# Patient Record
Sex: MALE
[De-identification: names, ages, dates, MRNs, and addresses within clinical notes are randomized per-mention and may not be internally consistent; named-entity substitution may affect disease eponyms.]

---

## 2020-11-29 ENCOUNTER — HOSPITAL ENCOUNTER (INPATIENT)
Dept: HOSPITAL 88 - ER | Age: 45
LOS: 5 days | Discharge: HOME | DRG: 581 | End: 2020-12-04
Attending: INTERNAL MEDICINE | Admitting: INTERNAL MEDICINE
Payer: COMMERCIAL

## 2020-11-29 VITALS — DIASTOLIC BLOOD PRESSURE: 83 MMHG | SYSTOLIC BLOOD PRESSURE: 139 MMHG

## 2020-11-29 VITALS — SYSTOLIC BLOOD PRESSURE: 140 MMHG | DIASTOLIC BLOOD PRESSURE: 73 MMHG

## 2020-11-29 VITALS — DIASTOLIC BLOOD PRESSURE: 75 MMHG | SYSTOLIC BLOOD PRESSURE: 133 MMHG

## 2020-11-29 VITALS — HEIGHT: 65 IN | BODY MASS INDEX: 31.49 KG/M2 | WEIGHT: 189 LBS

## 2020-11-29 VITALS — SYSTOLIC BLOOD PRESSURE: 139 MMHG | DIASTOLIC BLOOD PRESSURE: 83 MMHG

## 2020-11-29 VITALS — SYSTOLIC BLOOD PRESSURE: 133 MMHG | DIASTOLIC BLOOD PRESSURE: 75 MMHG

## 2020-11-29 VITALS — DIASTOLIC BLOOD PRESSURE: 73 MMHG | SYSTOLIC BLOOD PRESSURE: 140 MMHG

## 2020-11-29 DIAGNOSIS — L03.211: Primary | ICD-10-CM

## 2020-11-29 DIAGNOSIS — E78.5: ICD-10-CM

## 2020-11-29 DIAGNOSIS — I10: ICD-10-CM

## 2020-11-29 DIAGNOSIS — Z20.828: ICD-10-CM

## 2020-11-29 DIAGNOSIS — E78.00: ICD-10-CM

## 2020-11-29 DIAGNOSIS — F41.9: ICD-10-CM

## 2020-11-29 LAB
ALBUMIN SERPL-MCNC: 4.4 G/DL (ref 3.5–5)
ALBUMIN/GLOB SERPL: 1.4 {RATIO} (ref 0.8–2)
ALP SERPL-CCNC: 71 IU/L (ref 40–150)
ALT SERPL-CCNC: 46 IU/L (ref 0–55)
ANION GAP SERPL CALC-SCNC: 28.6 MMOL/L (ref 8–16)
BASOPHILS # BLD AUTO: 0 10*3/UL (ref 0–0.1)
BASOPHILS NFR BLD AUTO: 0.5 % (ref 0–1)
BUN SERPL-MCNC: 10 MG/DL (ref 7–26)
BUN/CREAT SERPL: 11 (ref 6–25)
CALCIUM SERPL-MCNC: 8.8 MG/DL (ref 8.4–10.2)
CHLORIDE SERPL-SCNC: 89 MMOL/L (ref 98–107)
CO2 SERPL-SCNC: 22 MMOL/L (ref 22–29)
DEPRECATED APTT PLAS QN: 20.2 SECONDS (ref 23.8–35.5)
DEPRECATED INR PLAS: 1.07
DEPRECATED NEUTROPHILS # BLD AUTO: 5.4 10*3/UL (ref 2.1–6.9)
EGFRCR SERPLBLD CKD-EPI 2021: > 60 ML/MIN (ref 60–?)
EOSINOPHIL # BLD AUTO: 0.2 10*3/UL (ref 0–0.4)
EOSINOPHIL NFR BLD AUTO: 2 % (ref 0–6)
ERYTHROCYTE [DISTWIDTH] IN CORD BLOOD: 12.7 % (ref 11.7–14.4)
GLOBULIN PLAS-MCNC: 3.1 G/DL (ref 2.3–3.5)
GLUCOSE SERPLBLD-MCNC: 108 MG/DL (ref 74–118)
HCT VFR BLD AUTO: 43.3 % (ref 38.2–49.6)
HGB BLD-MCNC: 14.5 G/DL (ref 14–18)
LYMPHOCYTES # BLD: 1.4 10*3/UL (ref 1–3.2)
LYMPHOCYTES NFR BLD AUTO: 17.9 % (ref 18–39.1)
MCH RBC QN AUTO: 29.7 PG (ref 28–32)
MCHC RBC AUTO-ENTMCNC: 33.5 G/DL (ref 31–35)
MCV RBC AUTO: 88.7 FL (ref 81–99)
MONOCYTES # BLD AUTO: 0.7 10*3/UL (ref 0.2–0.8)
MONOCYTES NFR BLD AUTO: 8.5 % (ref 4.4–11.3)
NEUTS SEG NFR BLD AUTO: 70.7 % (ref 38.7–80)
PLATELET # BLD AUTO: 225 X10E3/UL (ref 140–360)
POTASSIUM SERPL-SCNC: 3.6 MMOL/L (ref 3.5–5.1)
PROTHROMBIN TIME: 14.4 SECONDS (ref 11.9–14.5)
RBC # BLD AUTO: 4.88 X10E6/UL (ref 4.3–5.7)
SODIUM SERPL-SCNC: 136 MMOL/L (ref 136–145)

## 2020-11-29 PROCEDURE — 80048 BASIC METABOLIC PNL TOTAL CA: CPT

## 2020-11-29 PROCEDURE — 87071 CULTURE AEROBIC QUANT OTHER: CPT

## 2020-11-29 PROCEDURE — 87075 CULTR BACTERIA EXCEPT BLOOD: CPT

## 2020-11-29 PROCEDURE — 85025 COMPLETE CBC W/AUTO DIFF WBC: CPT

## 2020-11-29 PROCEDURE — 36415 COLL VENOUS BLD VENIPUNCTURE: CPT

## 2020-11-29 PROCEDURE — 80202 ASSAY OF VANCOMYCIN: CPT

## 2020-11-29 PROCEDURE — 70487 CT MAXILLOFACIAL W/DYE: CPT

## 2020-11-29 PROCEDURE — 85610 PROTHROMBIN TIME: CPT

## 2020-11-29 PROCEDURE — 80053 COMPREHEN METABOLIC PANEL: CPT

## 2020-11-29 PROCEDURE — 87205 SMEAR GRAM STAIN: CPT

## 2020-11-29 PROCEDURE — 85730 THROMBOPLASTIN TIME PARTIAL: CPT

## 2020-11-29 PROCEDURE — 87040 BLOOD CULTURE FOR BACTERIA: CPT

## 2020-11-29 PROCEDURE — 83605 ASSAY OF LACTIC ACID: CPT

## 2020-11-29 PROCEDURE — 99284 EMERGENCY DEPT VISIT MOD MDM: CPT

## 2020-11-29 RX ADMIN — SODIUM CHLORIDE PRN MG: 900 INJECTION INTRAVENOUS at 20:05

## 2020-11-29 RX ADMIN — VANCOMYCIN HYDROCHLORIDE SCH MLS/HR: 1 INJECTION, SOLUTION INTRAVENOUS at 22:41

## 2020-11-29 RX ADMIN — Medication PRN MG: at 20:05

## 2020-11-29 RX ADMIN — CLINDAMYCIN PHOSPHATE SCH MLS/HR: 6 INJECTION, SOLUTION INTRAVENOUS at 21:12

## 2020-11-29 RX ADMIN — SODIUM CHLORIDE SCH MLS/HR: 9 INJECTION, SOLUTION INTRAVENOUS at 14:55

## 2020-11-29 RX ADMIN — TAZOBACTAM SODIUM AND PIPERACILLIN SODIUM SCH MLS/HR: 375; 3 INJECTION, SOLUTION INTRAVENOUS at 17:29

## 2020-11-29 RX ADMIN — SODIUM CHLORIDE SCH MLS/HR: 9 INJECTION, SOLUTION INTRAVENOUS at 23:32

## 2020-11-29 RX ADMIN — CLINDAMYCIN PHOSPHATE SCH MLS/HR: 6 INJECTION, SOLUTION INTRAVENOUS at 14:55

## 2020-11-29 RX ADMIN — TAZOBACTAM SODIUM AND PIPERACILLIN SODIUM SCH MLS/HR: 375; 3 INJECTION, SOLUTION INTRAVENOUS at 14:00

## 2020-11-29 NOTE — EMERGENCY DEPARTMENT NOTE
History of Present Illnes


History of Present Illness


Chief Complaint:  Skin Rash or Abscess


History of Present Illness


This is a 45 year old  male LAST NIGHT FELT ITCHING TO LEFT SIDE OF 

FACE. WOKE UP THIS MORNING WITH REDNESS, SWELLING, TENDER, INDURATED.


Historian:  Patient


Additional Treatment PTA:  NONE


 Required:  No


Onset (how long ago):  hour(s)


Location:  LEFT FACE


Quality:  PAIN/SWELLING


Radiation:  Reports non-radiation


Severity:  moderate


Onset quality:  gradual


Timing of current episode:  constant


Progression:  worsening


Chronicity:  new


Context:  Denies recent illness


Relieving factors:  none


Exacerbating factors:  none


Associated symptoms:  Reports denies other symptoms





Past Medical/Family History


Physician Review


I have reviewed the patient's past medical and family history.  Any updates have

been documented here.





Past Medical History


Recent Fever:  No


Clinical Suspicion of Infectio:  No


New/Unexplained Change in Ment:  No


Past Medical History:  Hypertension, Anxiety, Hyperlipedemia


Past Surgical History:  Cholecysctectomy





Social History


Smoking Cessation:  Never Smoker


Counseling Performed:  No


Alcohol Use:  None


Any Illegal Drug Use:  No


TB Exposure/Symptoms:  No


Physically hurt or threatened:  No





Family History


Family history of heart diseas:  No





Other


Any Pre-Existing Lines (PICC,:  No





Review of Systems


Review of Systems


Constitutional:  Reports no symptoms


EENTM:  Reports as per HPI


Cardiovascular:  Reports no symptoms


Respiratory:  Reports no symptoms


Gastrointestinal:  Reports no symptoms


Genitourinary:  Reports no symptoms


Musculoskeletal:  Reports no symptoms


Integumentary:  Reports no symptoms


Neurological:  Reports no symptoms


Psychological:  Reports no symptoms


Endocrine:  Reports no symptoms


Hematological/Lymphatic:  Reports no symptoms





Physical Exam


Related Data


Allergies:  


Coded Allergies:  


     No Known Allergies (Unverified , 11/29/20)


Triage Vital Signs





Vital Signs








  Date Time  Temp Pulse Resp B/P (MAP) Pulse Ox O2 Delivery O2 Flow Rate FiO2


 


11/29/20 11:06 98.3 100 18 141/108 99 Room Air  








Vital signs reviewed:  Yes





Physical Exam


CONSTITUTIONAL





Constitutional:  Present well-developed, Present well-nourished


HENT


HENT:  Present normocephalic, Present atraumatic, Present other (LEFT FACIAL 

SWELLING/INDURATION, WITH TENDERNESS)


HENT L/R:  Present left ext ear normal, Present right ext ear normal


EYES





Eyes:  Reports PERRL, Reports conjunctivae normal


NECK


Neck:  Present ROM normal


PULMONARY


Pulmonary:  Present effort normal, Present breath sounds normal


CARDIOVASCULAR





Cardiovascular:  Present regular rhythm, Present heart sounds normal, Present 

capillary refill normal, Present normal rate


GASTROINTESTINAL





Abdominal:  Present soft, Present nontender, Present bowel sounds normal


GENITOURINARY





Genitourinary:  Present exam deferred


SKIN


Skin:  Present warm, Present dry


MUSCULOSKELETAL





Musculoskeletal:  Present ROM normal


NEUROLOGICAL





Neurological:  Present alert, Present oriented x 3, Present no gross motor or 

sensory deficits


PSYCHOLOGICAL


Psychological:  Present mood/affect normal, Present judgement normal





Results


Laboratory


Result Diagram:  


11/29/20 1124                                                                   

            11/29/20 1124





Laboratory





Laboratory Tests








Test


 11/29/20


12:38 11/29/20


11:24


 


White Blood Count


 


 7.64 x10e3/uL


(4.8-10.8)


 


Red Blood Count


 


 4.88 x10e6/uL


(4.3-5.7)


 


Hemoglobin


 


 14.5 g/dL


(14.0-18.0)


 


Hematocrit


 


 43.3 %


(38.2-49.6)


 


Mean Corpuscular Volume


 


 88.7 fL


(81-99)


 


Mean Corpuscular Hemoglobin


 


 29.7 pg


(28-32)


 


Mean Corpuscular Hemoglobin


Concent 


 33.5 g/dL


(31-35)


 


Red Cell Distribution Width


 


 12.7 %


(11.7-14.4)


 


Platelet Count


 


 225 x10e3/uL


(140-360)


 


Neutrophils (%) (Auto)


 


 70.7 %


(38.7-80.0)


 


Lymphocytes (%) (Auto)


 


 17.9 %


(18.0-39.1)


 


Monocytes (%) (Auto)


 


 8.5  %


(4.4-11.3)


 


Eosinophils (%) (Auto)


 


 2.0 %


(0.0-6.0)


 


Basophils (%) (Auto)


 


 0.5 %


(0.0-1.0)


 


Neutrophils # (Auto)  5.4 (2.1-6.9) 


 


Lymphocytes # (Auto)  1.4 (1.0-3.2) 


 


Monocytes # (Auto)  0.7 (0.2-0.8) 


 


Eosinophils # (Auto)  0.2 (0.0-0.4) 


 


Basophils # (Auto)  0.0 (0.0-0.1) 


 


Absolute Immature Granulocyte


(auto 


 0.03 x10e3/uL


(0-0.1)


 


Prothrombin Time


 


 14.4 seconds


(11.9-14.5)


 


Prothromb Time International


Ratio 


 1.07 





 


Activated Partial


Thromboplast Time 


 20.2 seconds


(23.8-35.5)


 


Sodium Level


 


 136 mmol/L


(136-145)


 


Potassium Level


 


 3.6 mmol/L


(3.5-5.1)


 


Chloride Level


 


 89 mmol/L


()


 


Carbon Dioxide Level


 


 22 mmol/L


(22-29)


 


Anion Gap


 


 28.6 mmol/L


(8-16)


 


Blood Urea Nitrogen


 


 10 mg/dL


(7-26)


 


Creatinine


 


 0.92 mg/dL


(0.72-1.25)


 


Estimat Glomerular Filtration


Rate 


 > 60 ML/MIN


(60-)


 


BUN/Creatinine Ratio  11 (6-25) 


 


Glucose Level


 


 108 mg/dL


()


 


Lactic Acid Level


 


 1.6 mmol/L


(0.5-2.0)


 


Calcium Level


 


 8.8 mg/dL


(8.4-10.2)


 


Total Bilirubin


 


 0.5 mg/dL


(0.2-1.2)


 


Aspartate Amino Transf


(AST/SGOT) 


 30 IU/L (5-34) 





 


Alanine Aminotransferase


(ALT/SGPT) 


 46 IU/L (0-55) 





 


Alkaline Phosphatase


 


 71 IU/L


()


 


Total Protein


 


 7.5 g/dL


(6.5-8.1)


 


Albumin


 


 4.4 g/dL


(3.5-5.0)


 


Globulin


 


 3.1 g/dL


(2.3-3.5)


 


Albumin/Globulin Ratio  1.4 (0.8-2.0) 








Lab results reviewed:  Yes





Imaging


Imaging results reviewed:  Yes





Assessment & Plan


Medical Decision Making


MDM


FACIAL SWELLING - CBC, CHEM, CX'S, LACTIC, CT FACE - R/O CELLULITIS VS ABSCESS, 

IV ABX'S, ADMIT





Reassessment


Reassessment


ADMIT TO DR FUNEZ





Assessment & Plan


Final Impression:  


(1) Facial cellulitis


Depart Disposition:  ADMITTED


Last Vital Signs











  Date Time  Temp Pulse Resp B/P (MAP) Pulse Ox O2 Delivery O2 Flow Rate FiO2


 


11/29/20 11:06 98.3 100 18 141/108 99 Room Air  








Medications in the ED





Sodium Chloride 1,000 ml @  0 mls/hr Q0M STAT IV  Last administered on 

11/29/20at 11:48; Admin Dose 1,000 MLS/HR;  Start 11/29/20 at 11:13;  Stop 

11/29/20 at 11:18;  Status DC


Lorazepam 1 mg ONCE IV  Last administered on 11/29/20at 11:48; Admin Dose 1 MG; 

Start 11/29/20 at 11:15;  Stop 11/29/20 at 12:00;  Status DC


Piperacillin Sod/ Tazobactam Sod 50 ml @ 50 mls/hr Q6H IV ;  Start 11/29/20 at 

12:00;  Stop 12/6/20 at 11:59


Vancomycin HCl 250 ml @  200 mls/hr NOW  ONCE IV  Last administered on 

11/29/20at 11:48; Admin Dose 200 MLS/HR;  Start 11/29/20 at 11:30;  Stop 

11/29/20 at 12:44;  Status DC


Morphine Sulfate 4 mg ONCE IV  Last administered on 11/29/20at 11:48; Admin Dose

4 MG;  Start 11/29/20 at 11:45;  Stop 11/29/20 at 12:59;  Status DC


Ondansetron HCl 4 mg ONCE IV  Last administered on 11/29/20at 11:48; Admin Dose 

4 MG;  Start 11/29/20 at 11:45;  Stop 11/29/20 at 12:59;  Status DC


Sodium Chloride 50 ml @ ud STK-MED ONCE .ROUTE ;  Start 11/29/20 at 12:18;  Stop

11/29/20 at 12:11;  Status DC


Iopamidol 74,000 mg STK-MED ONCE INJ ;  Start 11/29/20 at 12:19;  Stop 11/29/20 

at 12:12;  Status DC


Clindamycin Phosphate 50 ml @ 50 mls/hr Q8HR IV ;  Start 11/29/20 at 14:00;  

Stop 12/6/20 at 13:59


Acetaminophen 650 mg Q6H  PRN PO Mild Pain (1-3) or Fever>100.8;  Start 11/29/20

at 13:15;  Stop 12/29/20 at 13:14


Docusate Sodium 100 mg BID  PRN PO constipation;  Start 11/29/20 at 13:15;  Stop

12/29/20 at 13:14


Ondansetron HCl 4 mg Q4H  PRN IV NAUSEA AND VOMITING;  Start 11/29/20 at 13:15; 

Stop 12/29/20 at 13:14


Zolpidem Tartrate 5 mg HS  PRN PO INSOMNIA;  Start 11/29/20 at 21:00;  Stop 

12/6/20 at 20:59











JOSH RÍOS MD               Nov 29, 2020 13:31

## 2020-11-29 NOTE — NUR
Patient unable to provide home medication dose. Patient states that he will try to find out 
medication information.

## 2020-11-29 NOTE — DIAGNOSTIC IMAGING REPORT
CT MAX FAC/PARANASAL W



HISTORY: Left face swelling



COMPARISON:  None.



TECHNIQUE:

Axial CT images through the face were obtained with intravenous contrast.

Coronal/sagittal reformations were created.  One or more of the following dose

reduction techniques were used: Automated exposure control, adjustment of the

mA and/or kV according to patient size, and/or utilization of iterative

reconstruction technique.    



DISCUSSION:

Subcutaneous edema is seen throughout the left face. Underlying 1.2 cm

partially calcified, nodular subcutaneous lesion is seen in the left

premaxillary region, abutting the skin.  No discrete drainable fluid collection

is seen.



No dental periapical lucencies are seen. Multiple teeth are missing.



The palatine tonsils are mildly prominent with associated tonsilloliths. The

imaged upper aerodigestive tract is otherwise unremarkable. No radiographically

significant cervical adenopathy is seen. 



No acute fracture is seen. No destructive osseous lesions are seen.



The orbits are intact. Intraorbital contents are grossly unremarkable.



The paranasal sinuses are clear.



Otherwise, the visualized soft tissues and intracranial compartment are grossly

unremarkable.



IMPRESSION:

1.  Diffuse subcutaneous edema throughout the left face without discrete

drainable fluid collection.

2.  Underlying nonspecific 1.2 cm partially calcified nodular left premaxillary

subcutaneous lesion abutting the skin could be an epidermal inclusion cyst.

This can be correlated with physical examination.

3.  No dental periapical lucencies.



Signed by: Dr. Philippe Perales M.D. on 11/29/2020 1:36 PM

## 2020-11-29 NOTE — NUR
H&P



cc: face rash



HPI: 45yoM, PCP none, developed left cheek swelling and pain/redness that 
started yesterday.  Started as itching; Similar event about 6 yrs ago.



PMH HTN, Left facial cellulitis in 2014

PShx; cholecystectomy

Allergies; see emr

Fh/SH; marrie;d no cigs

med.s see MAR

ROS; no f/c/s/n/V/D/ONEILL/cp/sob/confusion/dizziness/vision changes/leg pain



v/s; revd

PE

tired appearing

anicteric; LEFT FACIAL AT CHEEK REGION WITH 
ERYTHEMA/INDURATION/TENDERNESS/WARMTH; no skin breakdown/discharge

ns1s2

mod bs

soft nt nd 

no e/t

skin dry

n. affect



labs/meds revd



A/P: 

Cellulitis of face- IV abx; cultures

Furuncle of face- Possible abscess; IV abx; f/u imaging; 

Obesity- screen for DM; check lipids

BMI 31.5- as above

Prop; scd

DIpso: f/u labs;

## 2020-11-29 NOTE — XMS REPORT
Clinical Summary

                             Created on: 2020



Yovany Herbert

External Reference #: [id number]

: 1975

Sex: Male



Demographics





                          Address                   [address] [place]  [address]

 

                          Home Phone                [phone]

 

                          Preferred Language        Unknown

 

                          Marital Status            

 

                          Qu[employer] Affiliation     [employer]

 

                          Race                      Unknown

 

                          Ethnic Group              Unknown





Author





                          Author                    [facility]

ict

 

                          Organization              [facility]

ict

 

                          Address                   Unknown

 

                          Phone                     Unavailable







Support





                Name            Relationship    Address         Phone

 

                    Ngoc Alejandro      [employer]                [address] [place]  [address]                     [phone]







Care Team Providers





                    Care Team Member Name Role                Phone

 

                          PCP                       Unavailable







Allergies

No Known Allergies



Medications





                          End Date                  Status



              Medication   Sig          Dispensed    Refills      Start  



                                         Date  

 

                                                    Active



                     PANTOPRAZOLE SODIUM  Take by             0   



                           (PROTONIX OR)             mouth.     

 

                                                    Active



              lisinopril (PRINIVIL) 10  Take 1 tablet  90 tablet    0           

   



                     mg tabletIndications:  by mouth            7  



                           Medication refill         daily.     

 

                                                    Active



              albuterol (VENTOLIN  Inhale 2     3 Month      0              



                 HFA,PROVENTIL HFA,PROAIR  Puffs by        Supply          7  



                           HFA) 90 mcg/actuation     mouth 4 times     



                           inhalerIndications:       daily as     



                           Medication refill         needed for     



                                         Wheezing.     

 

                                                    Active



              baclofen (LIORESAL) 10 mg  Take 1 tablet  30 tablet    0          

    



                     tabletIndications: Neck  by mouth 3          7  



                           pain                      times daily.     

 

                                                    Active



              PARoxetine (PAXIL) 30 mg  Take 2       60 tablet    2            0

  



                     tabletIndications: MDD  tablets by          7  



                           (major depressive         mouth every     



                           disorder), recurrent      morning.     



                                         episode, moderate      

 

                                                    Active



              clonazePAM (KLONOPIN) 1  Take 1 tablet  60 tablet    2            

  



                     mg tabletIndications: MDD  by mouth 2          7  



                           (major depressive         times daily     



                           disorder), recurrent      as needed for     



                           episode, moderate, Panic  Anxiety.     



                                         disorder with agoraphobia      

 

                                                    Active



              QUEtiapine (SEROQUEL) 50  Take 1 tablet  30 tablet    0           

   



                     mg tabletIndications: MDD  by mouth at         7  



                           (major depressive         bedtime     



                           disorder), recurrent      nightly.     



                                         episode, moderate, Panic      



                                         disorder with agoraphobia      







Active Problems





Not on file



Immunizations





  



                     Name                Administration Dates  Next Due

 

  



                           Influenza <Unspecified>   10/16/2016 







Social History





                                        Date



                 Tobacco Use     Types           Packs/Day       Years Used 

 

                                         



                                         Never Smoker    







                    Drinks/Week         oz/Week             Comments



                                         Alcohol Use   

 

                                        



0 Standard drinks or equivalent 0.0                        



                                         Not Asked   







 



                           Sex Assigned at Birth     Date Recorded

 

 



                                         Not on file 







                                        Industry



                           Job Start Date            Occupation 

 

                                        Not on file



                           Not on file               Not on file 







                                        Travel End



                           Travel History            Travel Start 

 





                                         No recent travel history available.







Last Filed Vital Signs

Not on file



Plan of Treatment





   



                 Health Maintenance  Due Date        Last Done       Comments

 

   



                     IMM Influenza Seasonal  10/01/2020          10/16/2016 



                                         Oct to March (>/= 19 yrs)   







Results

Not on fileafter 2019



Insurance





                                        Type



            Payer      Benefit    Subscriber ID  Effective  Phone      Address 



                           Plan /                    Dates   



                                         Group     

 

                                         



            [email or web address]     [email or web address]  2016-P  [phone]  [address] 



                     [address]          [address]              [id number] 



                           [place] 



                                         [address] 

 

                                         



            [email or web address]     xxxxxxxxx  2016-P  [phone]  [address]          resent              [phone] 



                           [place] 



                                         [address] 







     



            Guarantor Name  Account    Relation to  Date of    Phone      Billin

g Address



                     Type                Patient             Birth  

 

     



            Yovany Herbert  Personal/F  Self       1975  [phone]

[address]



                     [facility]               (Home)              [place] [address]

 

     



            Yovany Herbert  Psych      Self       1975  [phone]

[address]



                           (Home)                    [place] [address]

## 2020-11-29 NOTE — NUR
Received patient from ER. Patient oriented to room and call light. No s/s of distress, vital 
signs stable. Call light in reach, bed low, wheels locked, side rails x2. Will continue to 
monitor patient.

## 2020-11-29 NOTE — XMS REPORT
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

[facility]

 

             height in centimeters E&M 2019 09:59:05 165.10 cm            

     On license of [facility]

 

             blood pressure, diastolic 2019 13:35:43 87 mm[Hg]            

     On license of [facility]

 

             blood pressure, systolic 2019 13:35:43 124 mm[Hg]            

    On license of [facility]

 

             pulse rate   2019 13:35:43 100 /min                  [facility]

 

             weight E&M   2019 13:35:43 235.13 [lb_av]              [facility]

 

             weight in kilograms E&M 2019 13:35:43 106.88 kg              

   [facility]

 

             height in centimeters E&M 2019 13:35:43 165.10 cm            

     [facility]

 

             blood pressure, diastolic 2019 15:14:21 85 mm[Hg]            

     [facility]

 

             blood pressure, systolic 2019 15:14:21 126 mm[Hg]            

    [facility]

 

             pulse rate   2019 15:14:21 93 /min                   [facility]

 

             weight E&M   2019 15:14:21 235.38 [lb_av]              [facility]

 

             weight in kilograms E&M 2019 15:14:21 106.99 kg              

   On license of [facility]

 

             height in centimeters E&M 2019 15:14:21 165.10 cm            

     [facility]

 

             blood pressure, diastolic 2019 15:32:30 81 mm[Hg]            

     [facility]

 

             blood pressure, systolic 2019 15:32:30 129 mm[Hg]            

    [facility]

 

             pulse rate   2019 15:32:30 75 /min                   [facility]

 

             weight E&M   2019 15:32:30 233.25 [lb_av]              On license of [facility]

 

             weight in kilograms E&M 2019 15:32:30 106.02 kg              

   On license of [facility]

 

             height in centimeters E&M 2019 15:32:30 165.10 cm            

     On license of [facility]

 

             blood pressure, diastolic 2019 15:25:25 87 mm[Hg]            

     On license of [facility]

 

             blood pressure, systolic 2019 15:25:25 131 mm[Hg]            

    [facility]

 

             pulse rate   2019 15:25:25 84 /min                   [facility]

 

             weight E&M   2019 15:25:25 236.25 [lb_av]              On license of [facility]

 

             weight in kilograms E&M 2019 15:25:25 107.39 kg              

   On license of [facility]

 

             height in centimeters E&M 2019 15:25:25 165.10 cm            

     On license of [facility]

 

             oxygen saturation, oximetry 2018 13:33:31 96 %               

       On license of [facility]

 

             blood pressure, diastolic 2018 13:33:31 86 mm[Hg]            

     On license of [facility]

 

             blood pressure, systolic 2018 13:33:31 127 mm[Hg]            

    On license of [facility]

 

             respiratory rate E&M 2018 13:33:31 18 /min                   

On license of [facility]

 

             pulse rate   2018 13:33:31 99 /min                   [facility]

 

             temperature site 2018 13:33:31 oral                      [facility]

[facility]

 

             temperature E&M 2018 13:33:31 98.0 [degF]               [facility]

y [facility]

 

             weight E&M   2018 13:33:31 234.38 [lb_av]              [facility]

 

             weight in kilograms E&M 2018 13:33:31 106.54 kg              

   On license of [facility]

 

             height in centimeters E&M 2018 13:33:31 165.10 cm            

     On license of [facility]

 

             blood pressure, diastolic 2018-10-31 13:20:28 98 mm[Hg]            

     [facility]

 

             blood pressure, systolic 2018-10-31 13:20:28 160 mm[Hg]            

    [facility]

 

             pulse rate   2018-10-31 13:20:28 107 /min                  [facility]

 

             weight E&M   2018-10-31 13:20:28 233.25 [lb_av]              [facility]

 

             weight in kilograms E&M 2018-10-31 13:20:28 106.02 kg              

   On license of [facility]

 

             height in centimeters E&M 2018-10-31 13:20:28 165.10 cm            

     On license of [facility]

 

             blood pressure, diastolic 2018 15:24:24 84 mm[Hg]            

     [facility]

 

             blood pressure, systolic 2018 15:24:24 120 mm[Hg]            

    On license of [facility]

 

             pulse rate   2018 15:24:24 90 /min                   [facility]

 

             weight E&M   2018 15:24:24 233.80 [lb_av]              On license of [facility]

 

             weight in kilograms E&M 2018 15:24:24 106.27 kg              

   On license of [facility]

 

             height in centimeters E&M 2018 15:24:24 165.10 cm            

     On license of [facility]

 

             blood pressure, diastolic 2018 11:57:05 84 mm[Hg]            

     [facility]

 

             blood pressure, systolic 2018 11:57:05 131 mm[Hg]            

    [facility]

 

             pulse rate   2018 11:57:05 80 /min                   [facility]

 

             weight E&M   2018 11:57:05 236 [lb_av]               [facility]

 

             weight in kilograms E&M 2018 11:57:05 107.27 kg              

   On license of [facility]

 

             height in centimeters E&M 2018 11:57:05 165.10 cm            

     On license of [facility]

 

             oxygen saturation, oximetry 2018 09:46:12 99 %               

       On license of [facility]

 

             blood pressure, diastolic 2018 09:46:12 91 mm[Hg]            

     On license of [facility]

 

             blood pressure, systolic 2018 09:46:12 135 mm[Hg]            

    On license of [facility]

 

             respiratory rate E&M 2018 09:46:12 18 /min                   

On license of [facility]

 

             pulse rate   2018 09:46:12 99 /min                   [facility]

 

             temperature site 2018 09:46:12 oral                      [facility]

[facility]

 

             temperature E&M 2018 09:46:12 97.5 [degF]               [facility]

[facility]

 

             weight E&M   2018 09:46:12 242.60 [lb_av]              On license of [facility]

 

             weight in kilograms E&M 2018 09:46:12 110.27 kg              

   On license of [facility]

 

             height in centimeters E&M 2018 09:46:12 165.10 cm            

     [facility] 

[facility]

 

             weight E&M   2018 15:31:49 239.40 [lb_av]              On license of [facility]

 

             weight in kilograms E&M 2018 15:31:49 108.82 kg              

   On license of [facility]

 

             height E&M   2018 15:31:49 65 [in_i]                 [facility] site 2018 15:31:49 oral                      [facility]

[facility]

 

             temperature E&M 2018 15:31:49 98.1 [degF]               [facility]

y [facility]

 

             respiratory rate E&M 2018 15:31:49 20 /min                   

On license of [facility]

 

             oxygen saturation, oximetry 2018 15:31:49 97 %               

       On license of [facility]

 

             pulse rate   2018 15:31:49 88 /min                   [facility]

 

             blood pressure, diastolic 2018 15:31:49 86 mm[Hg]            

     On license of [facility]

 

             blood pressure, systolic 2018 15:31:49 126 mm[Hg]            

    [facility] 

[facility]

 

             blood pressure, diastolic 2018 15:07:07 107 mm[Hg]           

     [facility]

 

             blood pressure, systolic 2018 15:07:07 167 mm[Hg]            

    [facility]

 

             pulse rate   2018 15:07:07 88 /min                   [facility]

 

             weight E&M   2018 15:07:07 240.20 [lb_av]              [facility]

 

             weight in kilograms E&M 2018 15:07:07 109.18 kg              

   On license of [facility]

 

             height in centimeters E&M 2018 15:07:07 165.10 cm            

     On license of [facility]

 

             blood pressure, diastolic 2018-01-10 15:20:20 90 mm[Hg]            

     [facility]

 

             blood pressure, systolic 2018-01-10 15:20:20 145 mm[Hg]            

    On license of [facility]

 

             pulse rate   2018-01-10 15:20:20 107 /min                  [facility]

 

             weight E&M   2018-01-10 15:20:20 235 [lb_av]               [facility]

 

             weight in kilograms E&M 2018-01-10 15:20:20 106.82 kg              

   On license of [facility]

 

             height in centimeters E&M 2018-01-10 15:20:20 165.10 cm            

     [facility] 

[facility]

 

             blood pressure, diastolic 2017 08:35:01 86 mm[Hg]            

     On license of [facility]

 

             blood pressure, systolic 2017 08:35:01 129 mm[Hg]            

    [facility] 

[facility]

 

             pulse rate   2017 08:35:01 72 /min                   [facility]

 

             weight E&M   2017 08:35:01 234 [lb_av]               [facility]

[facility]

 

             weight in kilograms E&M 2017 08:35:01 106.36 kg              

   On license of [facility]

 

             height in centimeters E&M 2017 08:35:01 165.10 cm            

     [facility]

 

             blood pressure, diastolic 2017-11-15 13:13:40 86 mm[Hg]            

     [facility]

 

             blood pressure, systolic 2017-11-15 13:13:40 138 mm[Hg]            

    [facility]

 

             pulse rate   2017-11-15 13:13:40 87 /min                   [facility]

 

             weight E&M   2017-11-15 13:13:40 234.80 [lb_av]              On license of [facility]

 

             weight in kilograms E&M 2017-11-15 13:13:40 106.73 kg              

   On license of [facility]

 

             height in centimeters E&M 2017-11-15 13:13:40 165.10 cm            

     On license of [facility]

 

             blood pressure, diastolic 2017-10-16 14:54:28 80 mm[Hg]            

     On license of [facility]

 

             blood pressure, systolic 2017-10-16 14:54:28 123 mm[Hg]            

    On license of [facility]

 

             pulse rate   2017-10-16 14:54:28 102 /min                  [facility]

 

             weight E&M   2017-10-16 14:54:28 235 [lb_av]               [facility]

 

             weight in kilograms E&M 2017-10-16 14:54:28 106.82 kg              

   On license of [facility]

 

             height in centimeters E&M 2017-10-16 14:54:28 165.10 cm            

     On license of [facility]

 

             respiratory rate E&M 2017 15:21:26 18 /min                   

On license of [facility]

 

             pulse rate   2017 15:21:26 81 /min                   [facility]

 

             temperature E&M 2017 15:21:26 98.7 [degF]               [facility]

[facility]

 

             oxygen saturation, oximetry 2017 15:21:26 95 %               

       On license of [facility]

 

             blood pressure, diastolic 2017 15:21:26 80 mm[Hg]            

     On license of [facility]

 

             blood pressure, systolic 2017 15:21:26 119 mm[Hg]            

    On license of [facility]

 

             weight E&M   2017 15:21:26 232.20 [lb_av]              On license of [facility]

 

             weight in kilograms E&M 2017 15:21:26 105.55 kg              

   On license of [facility]

 

             temperature site 2017 15:21:26 oral                      [facility]

[facility]

 

             height in centimeters E&M 2017 15:21:26 165.10 cm            

     [facility] site 2017 16:09:31 oral                      [facility]

[facility]

 

             oxygen saturation, oximetry 2017 16:09:31 99 %               

       On license of [facility]

 

             blood pressure, diastolic 2017 16:09:31 86 mm[Hg]            

     On license of [facility]

 

             blood pressure, systolic 2017 16:09:31 135 mm[Hg]            

    On license of [facility]

 

             respiratory rate E&M 2017 16:09:31 18 /min                   

On license of [facility]

 

             pulse rate   2017 16:09:31 105 /min                  [facility]

 

             temperature E&M 2017 16:09:31 98.6 [degF]               [facility]

 

             weight E&M   2017 16:09:31 233 [lb_av]               [facility]

 

             weight in kilograms E&M 2017 16:09:31 105.91 kg              

   On license of [facility]

 

             height in centimeters E&M 2017 16:09:31 165.10 cm            

     On license of [facility]

 

             blood pressure, diastolic 2017-07-15 11:27:05 79 mm[Hg]            

     On license of [facility]

 

             blood pressure, systolic 2017-07-15 11:27:05 118 mm[Hg]            

    On license of [facility]

 

             pulse rate   2017-07-15 11:27:05 98 /min                   [facility]

 

             weight E&M   2017-07-15 11:27:05 241.40 [lb_av]              On license of [facility]

 

             weight in kilograms E&M 2017-07-15 11:27:05 109.73 kg              

   On license of [facility]

 

             height in centimeters E&M 2017-07-15 11:27:05 165.10 cm            

     On license of [facility]

 

             blood pressure, diastolic 2017 15:39:33 80 mm[Hg]            

     On license of [facility]

 

             blood pressure, systolic 2017 15:39:33 118 mm[Hg]            

    On license of [facility]

 

             pulse rate   2017 15:39:33 113 /min                  [facility]

 

             oxygen saturation, oximetry 2017 15:39:33 95 %               

       On license of [facility]

 

             temperature E&M 2017 15:39:33 96.1 [degF]               [facility]

y [facility]

 

             weight E&M   2017 15:39:33 235 [lb_av]               [facility]

 

             weight in kilograms E&M 2017 15:39:33 106.82 kg              

   On license of [facility]

 

             height in centimeters E&M 2017 15:39:33 165.10 cm            

     On license of [facility]

 

             temperature site 2017 15:39:33 tympanic                  [facility]

[facility]

 

             blood pressure, diastolic 2017 09:24:54 83 mm[Hg]            

     On license of [facility]

 

             blood pressure, systolic 2017 09:24:54 121 mm[Hg]            

    On license of [facility]

 

             pulse rate   2017 09:24:54 77 /min                   [facility]

 

             weight E&M   2017 09:24:54 238.20 [lb_av]              On license of [facility]

 

             weight in kilograms E&M 2017 09:24:54 108.27 kg              

   On license of [facility]

 

             height in centimeters E&M 2017 09:24:54 165.10 cm            

     On license of [facility]

 

             blood pressure, diastolic 2017 09:35:50 92 mm[Hg]            

     On license of [facility]

 

             blood pressure, systolic 2017 09:35:50 144 mm[Hg]            

    On license of [facility]

 

             pulse rate   2017 09:35:50 96 /min                   [facility]

 

             weight E&M   2017 09:35:50 236.60 [lb_av]              On license of [facility]

 

             weight in kilograms E&M 2017 09:35:50 107.55 kg              

   On [facility] of [facility]

 

             height in centimeters E&M 2017 09:35:50 165.10 cm            

     On [facility] of [facility]

 

             blood pressure, diastolic 2017 09:19:39 86 mm[Hg]            

     On [facility] of [facility]

 

             blood pressure, systolic 2017 09:19:39 124 mm[Hg]            

    On [facility] of [facility]

 

             pulse rate   2017 09:19:39 80 /min                   [facility]

 

             oxygen saturation, oximetry 2017 09:19:39 97 %               

       On license of [facility]

 

             temperature site 2017 09:19:39 tympanic                  [facility]

[facility]

 

             temperature E&M 2017 09:19:39 97.8 [degF]               [facility]

y [facility]

 

             weight E&M   2017 09:19:39 237 [lb_av]               [facility]

[facility]

 

             weight in kilograms E&M 2017 09:19:39 107.73 kg              

   On [facility] of [facility]

 

             height in centimeters E&M 2017 09:19:39 165.10 cm            

     On license of [facility]







Procedures





                Procedure       Date / Time Performed Performing Clinician [facility]

e

 

                Urinalysis - Dip only - In House 2017 16:48:40 Candi Granado On license of [facility]

 

                Diagnostic evaluation with medical - 72135 2017 11:10:35 M

Sharon brown 

On license of [facility]

 

                Venipuncture    2017 09:50:38 Brendan Garnett    [facility]







Plan of Care





             Planned Activity Planned Date Details      Comments     Source

 

                    Future Scheduled Test 2020-10-01 00:00:00 IMM Influenza Seas

onal Oct to March 

(>/= 19 yrs) [code = IMM Influenza Seasonal Oct to March (>/= 19 yrs)]          

                 [facility]







Encounters





             Start Date/Time End Date/Time Encounter Type Admission Type Attendi

[facility] Facility   Care Department Encounter ID    Source

 

        2020-10-14 00:00:00 2020-10-14 00:00:00 Office Visit         Arturo Murphy [facility]     

Encounter/[id number]              On license of [facility]

 

        2020-10-08 00:00:00 2020-10-08 00:00:00 Office Visit         Arturo Murphy [facility]     

Encounter/[id number]              On license of [facility]

 

        2020-10-06 00:00:00 2020-10-06 00:00:00 Office Visit         Sandra Millard [facility]     

Encounter/[id number]              On license of [facility]

 

        2020-10-06 00:00:00 2020-10-06 00:00:00 Office Visit         Atruro Murphy [facility]     

Encounter/[id number]              On license of [facility]

 

        2020 00:00:00 2020 00:00:00 Office Visit         Yousif Zuleta [facility]     

Encounter/[id number]              On license of [facility]

 

        2020 00:00:00 2020 00:00:00 Office Visit         Arturo Murphy [facility]     

Encounter/[id number]              [facility]

 

        2020 00:00:00 2020 00:00:00 Office Visit         Yousif Zuleta [facility]     [place]     

Encounter/[id number]              [facility]

 

        2020 00:00:00 2020 00:00:00 Office Visit         [facility]     [place]     

Encounter/[id number]              [facility]

 

             2020 00:00:00 2020 00:00:00 Office Visit              Lesa Bansal [facility]             [place]             Encounter/[id number]

[id number] [facility]

 

        2020 00:00:00 2020 00:00:00 Office Visit         David Gallardo [facility]     [place]     

Encounter/[id number]              On license of [facility]

 

             2020-05-15 00:00:00 2020-05-15 00:00:00 Office Visit              Damion Mccain [facility]             [place]             Encounter/[id number]

[id number] [facility]

 

        2020-05-15 00:00:00 2020-05-15 00:00:00 Office Visit         David Gallardo [facility]     [place]     

Encounter/[id number]              [facility]

 

             2020-05-15 00:00:00 2020-05-15 00:00:00 Office Visit              Damion Mccain Paulina [facility]             [place]             Encounter/[id number] [facility]

 

        2020 00:00:00 2020 00:00:00 Office Visit         David Gallardo [facility]     [place]     

Encounter/[id number]              [facility]

 

        2020 00:00:00 2020 00:00:00 Office Visit         David Gallardo [facility]     [place]     

Encounter/[id number]              [facility]

 

             2020 00:00:00 2020 00:00:00 Office Visit              Damion Mccain MedAdherence,Benigno LC             LC             Encounter/[id number]

[id number] On license of [facility]

 

        2020 00:00:2020 00:00:00 Office Visit         SamiDavid LC     LC     

Encounter/[id number]              On license of [facility]

 

        2020 00:00:00 2020 00:00:00 Office Visit         SamiDavid LC     LCH     

Encounter/[id number]              On license of [facility]

 

        2020 00:00:00 2020 00:00:00 Office Visit         Sami David clemente [facility]     LCH     

Encounter/[id number]              On license of [facility]

 

        2020 00:00:00 2020 00:00:00 Office Visit         Sami David clemente [facility]     LCH     

Encounter/[id number]              On license of [facility]

 

        2020 00:00:00 2020 00:00:00 Office Visit         SamiDavid [facility]     LCH     

Encounter/[id number]              On license of [facility]

 

             2020 00:00:00 2020 00:00:00 Office Visit              Beverly Connolly Luis E LC             LC             Encounter/[id number] [facility]

 

        2020 00:00:00 2020 00:00:00 Office Visit         Arturo Murphy LC     LCH     

Encounter/[id number]              On license of [facility]

 

             2020 00:00:00 2020 00:00:00 Office Visit              Rodney Rich Marlin LC             LC             Encounter/[id number] Leg

[facility]

 

             2020 00:00:00 2020 00:00:00 Office Visit              Rodney Rich Jose E  LC             LC             [id number] [facility]

 

        2020 00:00:00 2020 00:00:00 Office Visit         Arturo Murphy LC     LCH     

Encounter/[id number]              On license of [facility]

 

             2020 00:00:00 2020 00:00:00 Office Visit              Rodney Rich Jennifer LC             LCH             Encounter/[id number] [facility]

 

             2020 00:00:00 2020 00:00:00 Office Visit              Shreya Ellis Jacklyn LC             LCH             Encounter/[id number] [facility]

 

        2019 00:00:00 2019 00:00:00 Office Visit         Arturo Murphy LC     LCH     

Encounter/[id number]              On license of [facility]

 

             2019 00:00:00 2019 00:00:00 Office Visit              Rodney Rich Nancy    LC             LCH             Encounter/[id number] [facility]

 

        2019 00:00:00 2019 00:00:00 Office Visit         Song Yousif

rajiv LC     LCH     

Encounter/[id number]              On license of [facility]

 

        2019-10-24 00:00:00 2019-10-24 00:00:00 Office Visit         Arturo Murphy LC     LCH     

Encounter/[id number]              On license of [facility]

 

             2019-10-24 00:00:00 2019-10-24 00:00:00 Office Visit              Rodney Rich Nancy    LC             LCH             Encounter/[id number] [facility]

 

        2019 00:00:00 2019 00:00:00 Office Visit         Arturo Murphy LC     LCH     

Encounter/[id number]              On license of [facility]

 

             2019 00:00:00 2019 00:00:00 Office Visit              Rodney Rich Nancy    LC             LCH             Encounter/[id number] [facility]

 

        2019 00:00:00 2019 00:00:00 Office Visit         Status, Fax

 LCH     LCH     

Encounter/[id number]              On license of [facility]

 

        2019 00:00:00 2019 00:00:00 Office Visit         Status, Fax

 LCH     LCH     

Encounter/[id number]              On license of [facility]

 

        2019 00:00:00 2019 00:00:00 Office Visit         Status, Fax

 LCH     LCH     

Encounter/[id number]              On license of [facility]

 

        2019 00:00:00 2019 00:00:00 Office Visit         LoyCandi REEMA LC     LCH     

Encounter/[id number]              On license of [facility]

 

             2019 00:00:00 2019 00:00:00 Office Visit              Shkehar Romero Dennys Vasquez, Adriana LC             LCH             Encounter/[id number] [facility]

 

        2019-07-10 00:00:00 2019-07-10 00:00:00 Office Visit         Candi Granado

charly REEMA LC     LCH     

Encounter/[id number]              On license of [facility]

 

        2019 00:00:00 2019 00:00:00 Office Visit         Candi Granado LCH     LCH     

Encounter/[id number]              On license of [facility]

 

        2019 00:00:00 2019 00:00:00 Office Visit         Candi Granado LC     LCH     

Encounter/[id number]              On license of [facility]

 

        2019 00:00:00 2019 00:00:00 Office Visit         Candi Granado LC     LCH     

Encounter/[id number]              On license of [facility]

 

        2019 00:00:00 2019 00:00:00 Office Visit         Candi Granado

charly REEMA LCH     LCH     

Encounter/[id number]              On license of [facility]

 

             2019 00:00:00 2019 00:00:00 Office Visit              Shekhar Romero Deborath LCH             LCH             Encounter/[id number] [facility]

 

        2019 00:00:00 2019 00:00:00 Office Visit         Arturo Murphy LCH     LCH     

Encounter/[id number]              On license of [facility]

 

        2019 00:00:00 2019 00:00:00 Office Visit         Arturo Murphy LCH     LCH     

Encounter/[id number]              On license of [facility]

 

             2019 00:00:00 2019 00:00:00 Office Visit              B

Rodney dennis Nancy LC             LC             [id number] [facility]

 

        2019 00:00:00 2019 00:00:00 Office Visit         Arturo Murphy     LCH     

Encounter/[id number]              On license of [facility]

 

        2019 00:00:00 2019 00:00:00 Office Visit         Candi Granado LC     LCH     

Encounter/[id number]              On license of [facility]

 

        2019 00:00:00 2019 00:00:00 Office Visit         Arturo Murphy LC     LCH     

Encounter/[id number]              On license of [facility]

 

             2019 00:00:00 2019 00:00:00 Office Visit              Rodney Rich Nancy LC             LCH             [facility][id number] [facility]

 

        2019 00:00:00 2019 00:00:00 Office Visit         Arturo Murphy LC     LCH     

Encounter/[id number]              On license of [facility]

 

             2019 00:00:00 2019 00:00:00 Office Visit              Rodney Rich Nancy LC             LC             [facility][id number] [facility]

 

           2019 19:00:00 2019 21:15:00 Departed Emergency Room 1    

      JOSH RÍOS 

[facility]              [id number]        [facility]

 

        2019 00:00:00 2019 00:00:00 Office Visit         Candi Granado LC     LCH     

Encounter/[id number]              On license of [facility]

 

        2019 00:00:00 2019 00:00:00 Office Visit         Arturo Murphy LC     LCH     

Encounter/[id number]              On license of [facility]

 

             2019 00:00:00 2019 00:00:00 Office Visit              Rodney Rich Nancy    LC             LCH             Encounter/[id number] [facility]

 

        2018 00:00:00 2018 00:00:00 Office Visit         Candi Granado LC     LCH     

Encounter/[id number]              On license of [facility]

 

             2018 00:00:00 2018 00:00:00 Office Visit              Shekhar Romero Deborath LC             LCH             Encounter/[id number] [facility]

 

        2018-11-15 00:00:00 2018-11-15 00:00:00 Office Visit         Jose Shafer LC     LCH     

Encounter/[id number]              On license of [facility]

 

        2018 00:00:00 2018 00:00:00 Office Visit         Candi Granado LC     LCH     

Encounter/[id number]              On license of [facility]

 

        2018-10-31 00:00:00 2018-10-31 00:00:00 Office Visit         Arturo Murphy LC     LCH     

Encounter/[id number]              On license of [facility]

 

             2018-10-31 00:00:00 2018-10-31 00:00:00 Office Visit              Rodney Rich Dalila  LC             LCH             Encounter/[id number] [facility]

 

        2018-10-26 00:00:00 2018-10-26 00:00:00 Office Visit         Arutro Murphy LCH     LCH     

Encounter/[id number]              On license of [facility]

 

             2018-10-15 00:00:00 2018-10-15 00:00:00 Office Visit              Rodney Rich Wendy LC             LCH             Encounter/[id number] [facility]

 

        2018 00:00:00 2018 00:00:00 Office Visit         Arturo Murphy LC     LCH     

Encounter/[id number]              On license of [facility]

 

             2018 00:00:00 2018 00:00:00 Office Visit              Rodney Rich Grace     LC             LCH             Encounter/[id number] [facility]

 

        2018 00:00:00 2018 00:00:00 Office Visit         Arturo Murphy LCH     LCH     

Encounter/[id number]              On license of [facility]

 

             2018 00:00:00 2018 00:00:00 Office Visit              JUDE cleaningarin Rodney

DomAnnemarie     LCH             LCH             Encounter/[id number] [facility]

 

        2018 00:00:00 2018 00:00:00 Office Visit         Arturo Murphy LCH     LCH     

Encounter/[id number]              On license of [facility]

 

        2018 00:00:00 2018 00:00:00 Office Visit         Arturo Murphy

ael LCH     LCH     

Encounter/[id number]              On license of [facility]

 

        2018 00:00:00 2018 00:00:00 Office Visit         Arturo Murphy LCH     LCH     

Encounter/[id number]              On license of [facility]

 

        2018 00:00:00 2018 00:00:00 Office Visit         Arturo Murphy LCH     LCH     

Encounter/[id number]              On license of [facility]

 

        2018 00:00:00 2018 00:00:00 Office Visit         Arturo Murphy LCH     LCH     

Encounter/[id number]              On license of [facility]

 

        2018 00:00:00 2018 00:00:00 Office Visit         Arturo Murphy LCH     LCH     

Encounter/[id number]              On license of [facility]

 

        2018 00:00:00 2018 00:00:00 Office Visit         Arturo Murphy LCH     LCH     

Encounter/[id number]              On license of [facility]

 

        2018 00:00:00 2018 00:00:00 Office Visit         AnisharinArturo LCH     LCH     

Encounter/[id number]              On license of [facility]

 

        2018 00:00:00 2018 00:00:00 Office Visit         AnisharinArturo LCH     LCH     

Encounter/[id number]              On license of [facility]

 

        2018 00:00:00 2018 00:00:00 Office Visit         Arturo Murphy LCH     LCH     

Encounter/[id number]              On license of [facility]

 

        2018 00:00:00 2018 00:00:00 Office Visit         Arturo Murphy LCH     LCH     

Encounter/[id number]              On license of [facility]

 

        2018 00:00:00 2018 00:00:00 Office Visit         KatherineArturo LCH     LCH     

Encounter/[id number]              On license of [facility]

 

        2018 00:00:00 2018 00:00:00 Office Visit         KatherineArturo LCH     LCH     

Encounter/[id number]              On license of [facility]

 

             2018 00:00:00 2018 00:00:00 Office Visit              Shekhar Romero Crystal LCH             LCH             Encounter/[id number] [facility]

 

        2018 00:00:00 2018 00:00:00 Office Visit         KatherineArturo LCH     LCH     

Encounter/[id number]              On license of [facility]

 

        2018 00:00:00 2018 00:00:00 Office Visit         KatherineArturo LCH     LCH     

Encounter/[id number]              On license of [facility]

 

        2018 00:00:00 2018 00:00:00 Office Visit         KatherineArturo aemichelle LCH     LCH     

Encounter/[id number]              On license of [facility]

 

             2018 00:00:00 2018 00:00:00 Office Visit              Matthew Cordon Joseline LCH             LCH             Encounter/[id number] [facility]

 

        2018 00:00:00 2018 00:00:00 Office Visit         Arturo Murphy

franklinmichelle LCH     LCH     

Encounter/[id number]              On license of [facility]

 

        2018 00:00:00 2018 00:00:00 Office Visit         Candi Granado LCH     LCH     

Encounter/[id number]              On license of [facility]

 

        2018 00:00:00 2018 00:00:00 Office Visit         Candi Granado LC     LCH     

Encounter/[id number]              On license of [facility]

 

             2018 00:00:00 2018 00:00:00 Office Visit              Shekhar Romero Iris    LC             LCH             Encounter/[id number] [facility]

 

             2018-03-15 00:00:00 2018-03-15 00:00:00 Office Visit              Shekhar Romero DeboraMatthew Flores LC             LCH             Encounter/[id number] [facility]

 

        2018 00:00:00 2018 00:00:00 Office Visit         Candi Granado LC     LCH     

Encounter/[id number]              On license of [facility]

 

             2018 00:00:00 2018 00:00:00 Office Visit              Shekhar Romero Ana  LC             LCH             Encounter/[id number] [facility]

 

        2018 00:00:00 2018 00:00:00 Office Visit         Arturo Murphy LCH     LCH     

Encounter/[id number]              On license of [facility]

 

        2018 00:00:00 2018 00:00:00 Office Visit         Arturo Murphy LCH     LCH     

Encounter/[id number]              On license of [facility]

 

        2018 00:00:00 2018 00:00:00 Office Visit         Arturo Murphy LCH     LCH     

Encounter/[id number]              On license of [facility]

 

        2018 00:00:00 2018 00:00:00 Office Visit         Arturo Murphy LCH     LCH     

Encounter/[id number]              On license of [facility]

 

             2018 00:00:00 2018 00:00:00 Office Visit              Rodney Rich Grace     LCH             LCH             Encounter/[id number] [facility]

 

        2018 00:00:00 2018 00:00:00 Office Visit         Arturo Murphy aemichelle LCH     LC     

Encounter/[id number]              On license of [facility]

 

        2018 00:00:00 2018 00:00:00 Office Visit         Arturo Murphy [facility]     LC     

Encounter/[id number]              On license of [facility]

 

        2018 00:00:00 2018 00:00:00 Office Visit         Arturo Murphy aemichelle [facility]     LC     

Encounter/[id number]              On license of [facility]

 

          2018 16:04:00 2018 18:24:00 Departed Emergency Room       

              [facility]                    [id number]              [facility]

 

        2018 00:00:00 2018 00:00:00 Office Visit         Katherine Arturo rowell [facility]     LC     

Encounter/[id number]              On license of [facility]

 

        2018-01-10 00:00:00 2018-01-10 00:00:00 Office Visit         Katherine Arturo rowell [facility]     LC     

Encounter/[id number]              On license of [facility]

 

             2018-01-10 00:00:00 2018-01-10 00:00:00 Office Visit              B

Rodney dennis Grace     [facility]             Encounter/[id number] [facility]

 

             2018 00:00:00 2018 00:00:00 Office Visit              Maryjane Israel

                [facility]             LC             Encounter/[id number] [facility]

 

             2018 00:00:00 2018 00:00:00 Office Visit              B

Rodney dennis Charukesi [facility]             LC             Encounter/[id number]

[id number] On license of [facility]

 

             2017 00:00:00 2017 00:00:00 Office Visit              Rodney Rich Grace Mejia, Guillermo [facility]             LC             Encounter/[id number] [facility]

 

        2017 00:00:00 2017 00:00:00 Office Visit         Arturo Murphy [facility]     LC     

Encounter/[id number]              On license of [facility]

 

        2017 00:00:00 2017 00:00:00 Office Visit         Julius Elias LC     LCH     

Encounter/[id number]              On license of [facility]

 

             2017 00:00:00 2017 00:00:00 Office Visit              Rodney Rich Grace     LC             LCH             Encounter/[id number] [facility]

 

             2017-11-15 00:00:00 2017-11-15 00:00:00 Office Visit              Sharon Wayne Maritza [facility]             LCH             Encounter/[id number] [facility]

 

        2017 00:00:00 2017 00:00:00 Office Visit         HarikaTiffany [facility]     LCH     

Encounter/[id number]              On license of [facility]

 

        2017 00:00:00 2017 00:00:00 Office Visit         Tiffany Shabazz [facility]     LCH     

Encounter/[id number]              On license of [facility]

 

        2017-10-18 00:00:00 2017-10-18 00:00:00 Office Visit         Tiffany Shabazz [facility]     LCH     

Encounter/[id number]              On license of [facility]

 

        2017-10-18 00:00:00 2017-10-18 00:00:00 Office Visit         Tiffany Shabazze [facility]     LCH     

Encounter/[id number]              On license of [facility]

 

             2017-10-16 00:00:00 2017-10-16 00:00:00 Office Visit              Sharon Wayne Thida     LC             LCH             Encounter/[id number] [facility]

 

        2017-10-05 00:00:00 2017-10-05 00:00:00 Office Visit         Tiffany Shabazz [facility]     LCH     

Encounter/[id number]              On license of [facility]

 

             2017 00:00:00 2017 00:00:00 Office Visit              Sharon Wayne Amanda Gomez, Adam     LC             LCH             Encounter/[id number] [facility]

 

             2017 00:00:2017 00:00:00 Office Visit              Sharon Wayne Virginia Balboa, Maritza Mejia, Guillermo LC             LC             Encounter/[id number] [facility]

 

        2017 00:00:00 2017 00:00:00 Office Visit         Candi Granado LC     LC     

Encounter/[id number]              On license of [facility]

 

             2017 00:00:00 2017 00:00:00 Office Visit              Shekhar Romero Dulce  [facility]             LC             Encounter/[id number] [facility]

 

        2017 00:00:00 2017 00:00:00 Office Visit         Candi Granado LC     LC     

Encounter/[id number]              On license of [facility]

 

             2017 00:00:00 2017 00:00:00 Office Visit              Shekhar Romero Dulce  LC             LC             Encounter/[id number] [facility]

 

        2017-09-15 00:00:00 2017-09-15 00:00:00 Office Visit         Federica Fierro LC     LC     

Encounter/[id number]              On license of [facility]

 

        2017-09-15 00:00:00 2017-09-15 00:00:00 Office Visit         Federica Fierro LC     LCH     

Encounter/[id number]              On license of [facility]

 

             2017 00:00:00 2017 00:00:00 Office Visit              Mary Giron Grace Marie, Nathalie Velasquez, Olivier Barragan LC             LC             Encounter/[id number] [facility]

 

        2017 00:00:00 2017 00:00:00 Office Visit         Federica Fierro LC     LCH     

Encounter/[id number]              On license of [facility]

 

        2017 00:00:00 2017 00:00:00 Office Visit         Federica Fierro LC     LCH     

Encounter/[id number]              On license of [facility]

 

        2017 00:00:00 2017 00:00:00 Office Visit         Johnnie Wheat LC     LC     

Encounter/[id number]              On license of [facility]

 

             2017-08-15 00:00:00 2017-08-15 00:00:00 Office Visit              Mary Giron Meagan Mejia, Guillermo [facility]             LC             Encounter/[id number] [facility]

 

        2017 00:00:00 2017 00:00:00 Office Visit         Federica Fierro [facility]     LC     

Encounter/[id number]              On license of [facility]

 

        2017 00:00:00 2017 00:00:00 Office Visit         Federica Fierro [facility]     LC     

Encounter/[id number]              On license of [facility]

 

        2017-07-15 00:00:00 2017-07-15 00:00:00 Office Visit         Tiffany Shabazz [facility]     LC     

Encounter/[id number]              On license of [facility]

 

        2017-07-15 00:00:00 2017-07-15 00:00:00 Office Visit         Tiffany Shabazz [facility]     LC     

Encounter/[id number]              On license of [facility]

 

             2017-07-15 00:00:00 2017-07-15 00:00:00 Office Visit              Sharon Wayne Velia    [facility]             LC             Encounter/[id number] [facility]

 

          2017 00:00:00 2017 00:00:00 Office Visit           Hay Perez [facility]                       Encounter/[id number] On license of [facility]

 

             2017 00:00:00 2017 00:00:00 Office Visit              Hay Quesada Erron   [facility]             LC             Encounter/[id number] [facility]

 

             2017 00:00:00 2017 00:00:00 Office Visit              Sharon Wayne Thida     [facility]             LC             Encounter/[id number] [facility]

 

             2017 00:00:00 2017 00:00:00 Office Visit              Sharon Wayne Isaias [facility]             Encounter/[id number] [facility]

 

        2017 00:00:00 2017 00:00:00 Office Visit         Dulce Garnett [facility]     

Encounter/[id number]              On license of [facility]

 

        2017 00:00:00 2017 00:00:00 Office Visit         Dulce Garnett [facility]     

Encounter/[id number]              On license of [facility]

 

        2017 00:00:00 2017 00:00:00 Office Visit         Dulce Garnett [facility]     

Encounter/[id number]              On license of [facility]

 

        2017 00:00:00 2017 00:00:00 Office Visit         Dulce Garnett [facility]     

Encounter/[id number]              On license of [facility]

 

             2017 00:00:00 2017 00:00:00 Office Visit              Brendan Zavala Channin [facility]             Encounter/[id number] [facility]

 

           2017 00:00:00 2017 00:00:00 Office Visit            Gabbie Laguerre 

[facility]                 Encounter/[id number] [facility]

 

        2017 00:00:00 2017 00:00:00 Office Visit         HumzaNarcisa [facility]     

Encounter/[id number]              On license of [facility]







Results





           Test Description Test Time  Test Comments Results    Result Comments 

Source

 

                    LDL cholesterol, serum 2020-10-06 08:51:00   

 

                                        Test Item

 

             LDL cholesterol, serum (test code = 2089-1) 166 mg/dL    0-99      

   H             





[facility] lipoproteins2020-10-06 08:51:00* 



             Test Item    Value        Reference Range Interpretation Comments

 

             very low density lipoproteins (test code = 2091-7) 45 mg/dL     5-4

0         H             





[facility]2020-10-06 08:51:00* 



             Test Item    Value        Reference Range Interpretation Comments

 

             HDL cholesterol, serum (test code = 2085-9) 46 mg/dL     >39       

                 





[facility] serum, fasting2020-10-06 08:51:00* 



             Test Item    Value        Reference Range Interpretation Comments

 

             triglyceride, serum, fasting (test code = 2571-8) 240 mg/dL    0-14

9        H             





[facility]2020-10-06 08:51:00* 



             Test Item    Value        Reference Range Interpretation Comments

 

             cholesterol, serum (test code = 2093-3) 257 mg/dL    100-199      H

             





[facility] aminotransferase (SGPT), serum2020-10-06 08:51:00
  * 



             Test Item    Value        Reference Range Interpretation Comments

 

             alanine aminotransferase (SGPT), serum (test code = 1742-6) 40 1/L 

      0-44                       





[facility]partate [facility]otransferase (SGOT), serum2020-10-06 
08:51:00* 



             Test Item    Value        Reference Range Interpretation Comments

 

             aspartate aminotransferase (SGOT), serum (test code = 1920-8) 27 1/

L       0-40                       





[facility] phosphatase, serum2020-10-06 08:51:00* 



             Test Item    Value        Reference Range Interpretation Comments

 

             alkaline phosphatase, serum (test code = 1783-0) 75 1/L       39-11

7                     





[facility], total2020-10-06 08:51:00* 



             Test Item    Value        Reference Range Interpretation Comments

 

             bilirubin, serum, total (test code = 1975-2) 0.3 mg/dL    0.0-1.2  

                  





[facility] Healthalbumin/globulin ratio, serum2020-10-06 08:51:00* 



             Test Item    Value        Reference Range Interpretation Comments

 

             albumin/globulin ratio, serum (test code = 1759-0) 1.7          1.2

-2.2                    





[facility] Healthglobulin, serum2020-10-06 08:51:00* 



             Test Item    Value        Reference Range Interpretation Comments

 

             globulin, serum (test code = 2336-6) 2.7          1.5-4.5          

          





[facility] Healthalbumin, serum2020-10-06 08:51:00* 



             Test Item    Value        Reference Range Interpretation Comments

 

             albumin, serum (test code = 1751-7) 4.6 g/dL     4.0-5.0           

         





[facility] Healthprotein, total, serum2020-10-06 08:51:00* 



             Test Item    Value        Reference Range Interpretation Comments

 

             protein, total, serum (test code = 2885-2) 7.3 g/dL     6.0-8.5    

                





[facility] Healthcalcium, serum2020-10-06 08:51:00* 



             Test Item    Value        Reference Range Interpretation Comments

 

             calcium, serum (test code = 2000-8) 9.8 mg/dL    8.7-10.2          

         





[facility]arbon dioxide, venous blood2020-10-06 08:51:00* 



             Test Item    Value        Reference Range Interpretation Comments

 

             carbon dioxide, venous blood (test code = 2027-1) 24 mmol/L    20-2

9                      





[facility] Healthchloride, serum2020-10-06 08:51:00* 



             Test Item    Value        Reference Range Interpretation Comments

 

             chloride, serum (test code = 2075-0) 97 mmol/L               

          





[facility] Healthpotassium, serum2020-10-06 08:51:00* 



             Test Item    Value        Reference Range Interpretation Comments

 

             potassium, serum (test code = 2823-3) 4.8 mmol/L   3.5-5.2         

           





[facility]odium, serum2020-10-06 08:51:00* 



             Test Item    Value        Reference Range Interpretation Comments

 

             sodium, serum (test code = 2951-2) 138 mmol/L   134-144            

        





[facility] Centerurea nitrogen/creatinine ratio, serum2020-10-06 08:51:00
  * 



             Test Item    Value        Reference Range Interpretation Comments

 

             urea nitrogen/creatinine ratio, serum (test code = 3097-3) 11      

     9-20                       





[facility] HealtheGFR if African American2020-10-06 08:51:00* 



             Test Item    Value        Reference Range Interpretation Comments

 

             eGFR if  (test code = 10589-8) 100 mL/min/((173/100

).m2) >59                        





[facility] CenterEstimated Glomerular Filtration Rate (calc)2020-10-06 
08:51:00* 



             Test Item    Value        Reference Range Interpretation Comments

 

                          Estimated Glomerular Filtration Rate (calc) (test code

 = 73108-3) 86 

mL/min/((173/100).m2) >59                                      





[facility]reatinine, serum2020-10-06 08:51:00* 



             Test Item    Value        Reference Range Interpretation Comments

 

             creatinine, serum (test code = 2160-0) 1.03 mg/dL   0.76-1.27      

            





[facility] Centerurea nitrogen, blood2020-10-06 08:51:00* 



             Test Item    Value        Reference Range Interpretation Comments

 

             urea nitrogen, blood (test code = 3094-0) 11 mg/dL     6-24        

               





[facility]blood glucose, random2020-10-06 08:51:00* 



             Test Item    Value        Reference Range Interpretation Comments

 

             blood glucose, random (test code = 2339-0) 107 mg/dL    65-99      

  H             





[facility] Centerimmature granulocytes, percentage of total cells, blood
2020-10-06 08:51:00* 



             Test Item    Value        Reference Range Interpretation Comments

 

                          immature granulocytes, percentage of total cells, bloo

d (test code = 57143-6) 0 

%                                                            





[facility] Centerbasophil count, absolute2020-10-06 08:51:00* 



             Test Item    Value        Reference Range Interpretation Comments

 

             basophil count, absolute (test code = 88385-8) 0.1 x10E3/uL 0.0-0.2

                    





[facility] CenterEosinophil Absolute Count2020-10-06 08:51:00* 



             Test Item    Value        Reference Range Interpretation Comments

 

             Eosinophil Absolute Count (test code = 15935-3) 0.5 X10E3/UL 0.0-0.

4      H             





[facility] Centermonocyte count, blood, automated2020-10-06 08:51:00* 



             Test Item    Value        Reference Range Interpretation Comments

 

             monocyte count, blood, automated (test code = 742-7) 0.6 X10E3/UL 0

.1-0.9                    





[facility] Centerlymphocyte count, blood, automated2020-10-06 08:51:00* 



             Test Item    Value        Reference Range Interpretation Comments

 

             lymphocyte count, blood, automated (test code = 731-0) 2.2 X10E3/UL

 0.7-3.1                    





[facility]Absolute Neutrophils2020-10-06 08:51:00* 



             Test Item    Value        Reference Range Interpretation Comments

 

             Absolute Neutrophils (test code = 98516-1) 2.7 X10E3/UL 1.4-7.0    

                





[facility] Centerbasophils as percent of blood leukocytes2020-10-06 
08:51:00* 



             Test Item    Value        Reference Range Interpretation Comments

 

             basophils as percent of blood leukocytes (test code = 707-0) 1 %   

                                  





[facility]ereosinophils as percent of blood leukocytes2020-10-06 
08:51:00* 



             Test Item    Value        Reference Range Interpretation Comments

 

             eosinophils as percent of blood leukocytes (test code = 713-8) 9 % 

                                    





[facility] Centermonocytes as percent of blood leukocytes2020-10-06 
08:51:00* 



             Test Item    Value        Reference Range Interpretation Comments

 

             monocytes as percent of blood leukocytes (test code = 5905-5) 9 %  

                                   





[facility] Centerlymphocytes as percent of blood leukocytes2020-10-06 
08:51:00* 



             Test Item    Value        Reference Range Interpretation Comments

 

             lymphocytes as percent of blood leukocytes (test code = 736-9) 36 %

                                    





[facility] Centerneutrophils as percent of blood leukocytes2020-10-06 
08:51:00* 



             Test Item    Value        Reference Range Interpretation Comments

 

             neutrophils as percent of blood leukocytes (test code = 770-8) 45 %

                                    





[facility] Centerplatelet count2020-10-06 08:51:00* 



             Test Item    Value        Reference Range Interpretation Comments

 

             platelet count (test code = 777-3) 279 X10E3/-450            

        





[facility] Centerred blood cell distribution width2020-10-06 08:51:00* 



             Test Item    Value        Reference Range Interpretation Comments

 

             red blood cell distribution width (test code = 788-0) 11.8 %       

11.6-15.4                  





[facility] corpuscular hemoglobin concentration, RBC2020-10-06 
08:51:00* 



             Test Item    Value        Reference Range Interpretation Comments

 

                    mean corpuscular hemoglobin concentration, RBC (test code = 

786-4) 33.6 G/DL           

31.5-35.7                                            





[facility] corpuscular hemoglobin, RBC2020-10-06 08:51:00* 



             Test Item    Value        Reference Range Interpretation Comments

 

             mean corpuscular hemoglobin, RBC (test code = 785-6) 30.2 pg      2

6.6-33.0                  





[facility] corpuscular volume, RBC2020-10-06 08:51:00* 



             Test Item    Value        Reference Range Interpretation Comments

 

             mean corpuscular volume, RBC (test code = 787-2) 90 fL        79-97

                      





[facility]hematocrit, blood2020-10-06 08:51:00* 



             Test Item    Value        Reference Range Interpretation Comments

 

             hematocrit, blood (test code = 4544-3) 44.7 %       37.5-51.0      

            





[facility] Centerhemoglobin, blood2020-10-06 08:51:00* 



             Test Item    Value        Reference Range Interpretation Comments

 

             hemoglobin, blood (test code = 718-7) 15.0 g/dL    13.0-17.7       

           





[facility] Centererythrocyte (RBC) count2020-10-06 08:51:00* 



             Test Item    Value        Reference Range Interpretation Comments

 

             erythrocyte (RBC) count (test code = 789-8) 4.97 X10E6/UL 4.14-5.80

                  





[facility] Centerleukocyte count, blood2020-10-06 08:51:00* 



             Test Item    Value        Reference Range Interpretation Comments

 

             leukocyte count, blood (test code = 6690-2) 6.0 X10E3/UL 3.4-10.8  

                 





[facility] Centerurinalysis, microscopic [id number] 11:00:00* 



             Test Item    Value        Reference Range Interpretation Comments

 

             urinalysis, microscopic examination (test code = 63850-2) MICNIP   

                               





[facility] Centernitrate, [id number] 11:00:00* 



             Test Item    Value        Reference Range Interpretation Comments

 

             nitrate, urine (test code = 16195-2) Negative     Negative         

          





[facility] Centerurobilinogen, urine, semiquantitative (dipstick)
2019 11:00:00* 



             Test Item    Value        Reference Range Interpretation Comments

 

                    urobilinogen, urine, semiquantitative (dipstick) (test code 

= 5818-0) 0.2                 

0.2-1.0                                              





[facility] Centerbilirubin, [id number] 11:00:00* 



             Test Item    Value        Reference Range Interpretation Comments

 

             bilirubin, urine (test code = 5770-3) Negative     Negative        

           





[facility] Centerketones, urine, by test [id number] 11:00:00* 



             Test Item    Value        Reference Range Interpretation Comments

 

             ketones, urine, by test strip (test code = 5797-6) Negative     Neg

ative                   





[facility]erglucose, urine, [id number] 11:00:00* 



             Test Item    Value        Reference Range Interpretation Comments

 

             glucose, urine, semiquantitative (test code = 5792-7) Negative     

Negative                   





[facility] Centerprotein, urine, semiquantitative (dipstick)2019 
11:00:00* 



             Test Item    Value        Reference Range Interpretation Comments

 

                    protein, urine, semiquantitative (dipstick) (test code = 175

3-3) Negative            

Negative/Trace                                       





[facility] Centerleukocyte esterase, urine, by [id number] 11:00:00
  * 



             Test Item    Value        Reference Range Interpretation Comments

 

             leukocyte esterase, urine, by dipstick (test code = 5799-2) Negativ

e     Negative                  







[facility] Centerappearance, [id number] 11:00:00* 



             Test Item    Value        Reference Range Interpretation Comments

 

             appearance, urine (test code = 5767-9) Clear        Clear          

            





[facility] Centerurine [id number] 11:00:00* 



             Test Item    Value        Reference Range Interpretation Comments

 

             urine color (test code = 5778-6) Yellow       Yellow               

      





[facility] CenterpH, urine, iwwkaxnakeewzxwt5652-91-66 11:00:00* 



             Test Item    Value        Reference Range Interpretation Comments

 

             pH, urine, semiquantitative (test code = 5803-2) 5.5          5.0-7

.5                    





[facility] Centerspecific gravity, body [id number] 11:00:00* 



             Test Item    Value        Reference Range Interpretation Comments

 

             specific gravity, body fluid (test code = 2964-5) 1.020        1.00

5-1.030                





[facility] CenterLDL cholesterol, [id number] 10:57:00* 



             Test Item    Value        Reference Range Interpretation Comments

 

             LDL cholesterol, serum (test code = 2089-1) 153 mg/dL    0-99      

   H             





[facility] Centervery low density ucmaouqjsjjn7490-75-87 10:57:00* 



             Test Item    Value        Reference Range Interpretation Comments

 

             very low density lipoproteins (test code = 2091-7) 34 mg/dL     5-4

0                       





[facility] CenterHDL cholesterol, izyfa7722-87-87 10:57:00* 



             Test Item    Value        Reference Range Interpretation Comments

 

             HDL cholesterol, serum (test code = 2085-9) 45 mg/dL     >39       

                 





[facility]ertriglyceride, serum, [id number] 10:57:00* 



             Test Item    Value        Reference Range Interpretation Comments

 

             triglyceride, serum, fasting (test code = 2571-8) 171 mg/dL    0-14

9        H             





[facility] Centercholesterol, [id number] 10:57:00* 



             Test Item    Value        Reference Range Interpretation Comments

 

             cholesterol, serum (test code = 2093-3) 232 mg/dL    100-199      H

             





[facility] Centeralanine aminotransferase (SGPT), [id number] 10:57:00
  * 



             Test Item    Value        Reference Range Interpretation Comments

 

             alanine aminotransferase (SGPT), serum (test code = 1742-6) 27 1/L 

      0-44                       





[facility] license of [facility] Centeraspartate aminotransferase (SGOT), [id number] 
10:57:00* 



             Test Item    Value        Reference Range Interpretation Comments

 

             aspartate aminotransferase (SGOT), serum (test code = 1920-8) 18 1/

L       0-40                       





[facility] Centeralkaline phosphatase, [id number] 10:57:00* 



             Test Item    Value        Reference Range Interpretation Comments

 

             alkaline phosphatase, serum (test code = 1783-0) 63 1/L       39-11

7                     





[facility] Healthbilirubin, serum, [id number] 10:57:00* 



             Test Item    Value        Reference Range Interpretation Comments

 

             bilirubin, serum, total (test code = 1975-2) 0.3 mg/dL    0.0-1.2  

                  





[facility] Healthalbumin/globulin ratio, [id number] 10:57:00* 



             Test Item    Value        Reference Range Interpretation Comments

 

             albumin/globulin ratio, serum (test code = 1759-0) 1.7          1.2

-2.2                    





[facility] Healthglobulin, [id number] 10:57:00* 



             Test Item    Value        Reference Range Interpretation Comments

 

             globulin, serum (test code = 2336-6) 2.6          1.5-4.5          

          





[facility] Healthalbumin, zrynz1619-25-75 10:57:00* 



             Test Item    Value        Reference Range Interpretation Comments

 

             albumin, serum (test code = 1751-7) 4.4 g/dL     3.5-5.5           

         





[facility] Healthprotein, total, [id number] 10:57:00* 



             Test Item    Value        Reference Range Interpretation Comments

 

             protein, total, serum (test code = 2885-2) 7.0 g/dL     6.0-8.5    

                





[facility] Healthcalcium, [id number] 10:57:00* 



             Test Item    Value        Reference Range Interpretation Comments

 

             calcium, serum (test code = 2000-8) 9.6 mg/dL    8.7-10.2          

         





[facility]ercarbon dioxide, venous [id number] 10:57:00* 



             Test Item    Value        Reference Range Interpretation Comments

 

             carbon dioxide, venous blood (test code = 2027-1) 24 mmol/L    20-2

9                      





[facility] Healthchloride, [id number] 10:57:00* 



             Test Item    Value        Reference Range Interpretation Comments

 

             chloride, serum (test code = 2075-0) 101 mmol/L              

          





[facility] Healthpotassium, [id number] 10:57:00* 



             Test Item    Value        Reference Range Interpretation Comments

 

             potassium, serum (test code = 2823-3) 4.4 mmol/L   3.5-5.2         

           





[facility] Healthsodium, [id number] 10:57:00* 



             Test Item    Value        Reference Range Interpretation Comments

 

             sodium, serum (test code = 2951-2) 139 mmol/L   134-144            

        





[facility] Centerurea nitrogen/creatinine ratio, bwbhh7822-77-87 10:57:00
  * 



             Test Item    Value        Reference Range Interpretation Comments

 

             urea nitrogen/creatinine ratio, serum (test code = 3097-3) 8       

     9-20         L             





[facility] HealtheGFR if African [id number] 10:57:00* 



             Test Item    Value        Reference Range Interpretation Comments

 

             eGFR if  (test code = 91223-5) 104 mL/min/((173/100

).m2) >59                        





[facility] CenterEstimated Glomerular Filtration Rate (calc)2019 
10:57:00* 



             Test Item    Value        Reference Range Interpretation Comments

 

                          Estimated Glomerular Filtration Rate (calc) (test code

 = 00231-5) 90 

mL/min/((173/100).m2) >59                                      





[facility]ercreatinine, [id number] 10:57:00* 



             Test Item    Value        Reference Range Interpretation Comments

 

             creatinine, serum (test code = 2160-0) 1.00 mg/dL   0.76-1.27      

            





[facility] license of [facility] Centerurea nitrogen, [id number] 10:57:00* 



             Test Item    Value        Reference Range Interpretation Comments

 

             urea nitrogen, blood (test code = 3094-0) 8 mg/dL      6-24        

               





[facility] Centerblood glucose, [id number] 10:57:00* 



             Test Item    Value        Reference Range Interpretation Comments

 

             blood glucose, random (test code = 2339-0) 97 mg/dL     65-99      

                





[facility] Centerimmature granulocytes, percentage of total cells, blood
2019 10:57:00* 



             Test Item    Value        Reference Range Interpretation Comments

 

                          immature granulocytes, percentage of total cells, bloo

d (test code = 12542-2) 0 

%                                                            





[facility] Healthbasophil count, kzfltcxj8209-00-42 10:57:00* 



             Test Item    Value        Reference Range Interpretation Comments

 

             basophil count, absolute (test code = 56710-3) 0.0 x10E3/uL 0.0-0.2

                    





[facility] HealthEosinophil Absolute [id number] 10:57:00* 



             Test Item    Value        Reference Range Interpretation Comments

 

             Eosinophil Absolute Count (test code = 76896-2) 0.4 X10E3/UL 0.0-0.

4                    





[facility] Healthmonocyte count, blood, [id number] 10:57:00* 



             Test Item    Value        Reference Range Interpretation Comments

 

             monocyte count, blood, automated (test code = 742-7) 0.5 X10E3/UL 0

.1-0.9                    





[facility] Centerlymphocyte count, blood, ijxmvyhws9301-57-65 10:57:00* 



             Test Item    Value        Reference Range Interpretation Comments

 

             lymphocyte count, blood, automated (test code = 731-0) 2.1 X10E3/UL

 0.7-3.1                    





[facility] HealthAbsolute Rnllbrrqudv2003-04-52 10:57:00* 



             Test Item    Value        Reference Range Interpretation Comments

 

             Absolute Neutrophils (test code = 61249-3) 2.3 X10E3/UL 1.4-7.0    

                





[facility] Centerbasophils as percent of blood [id number] 
10:57:00* 



             Test Item    Value        Reference Range Interpretation Comments

 

             basophils as percent of blood leukocytes (test code = 707-0) 1 %   

                                  





[facility] [facility] oexekcpvmx2792-92-33 
10:57:00* 



             Test Item    Value        Reference Range Interpretation Comments

 

             eosinophils as percent of blood leukocytes (test code = 713-8) 8 % 

                                    





[facility] Healthmonocytes as percent of blood uajrsawryd8504-13-89 
10:57:00* 



             Test Item    Value        Reference Range Interpretation Comments

 

             monocytes as percent of blood leukocytes (test code = 5905-5) 9 %  

                                   





[facility] Healthlymphocytes as percent of blood oskjnnwrnl0001-54-79 
10:57:00* 



             Test Item    Value        Reference Range Interpretation Comments

 

             lymphocytes as percent of blood leukocytes (test code = 736-9) 40 %

                                    





[facility] percent [facility] dliwlvqecj2584-50-81 
10:57:00* 



             Test Item    Value        Reference Range Interpretation Comments

 

             neutrophils as percent of blood leukocytes (test code = 770-8) 42 %

                                    





[facility]let cbqxq7454-93-94 10:57:00* 



             Test Item    Value        Reference Range Interpretation Comments

 

             platelet count (test code = 777-3) 284 X10E3/-450            

        





[facility] cell distribution jskwp7230-52-72 10:57:00* 



             Test Item    Value        Reference Range Interpretation Comments

 

             red blood cell distribution width (test code = 788-0) 13.5 %       

12.3-15.4                  





[facility] corpuscular hemoglobin concentration, [id number] 
10:57:00* 



             Test Item    Value        Reference Range Interpretation Comments

 

                    mean corpuscular hemoglobin concentration, RBC (test code = 

786-4) 32.6 G/DL           

31.5-35.7                                            





[facility] corpuscular hemoglobin, HPA1496-94-26 10:57:00* 



             Test Item    Value        Reference Range Interpretation Comments

 

             mean corpuscular hemoglobin, RBC (test code = 785-6) 30.0 pg      2

6.6-33.0                  





[facility] corpuscular volume, [id number] 10:57:00* 



             Test Item    Value        Reference Range Interpretation Comments

 

             mean corpuscular volume, RBC (test code = 787-2) 92 fL        79-97

                      





[facility]hematocrit, feggy5688-32-00 10:57:00* 



             Test Item    Value        Reference Range Interpretation Comments

 

             hematocrit, blood (test code = 4544-3) 43.8 %       37.5-51.0      

            





[facility] Centerhemoglobin, fvimc1726-02-60 10:57:00* 



             Test Item    Value        Reference Range Interpretation Comments

 

             hemoglobin, blood (test code = 718-7) 14.3 g/dL    13.0-17.7       

           





[facility] Centererythrocyte (RBC) vhkob3832-97-34 10:57:00* 



             Test Item    Value        Reference Range Interpretation Comments

 

             erythrocyte (RBC) count (test code = 789-8) 4.77 X10E6/UL 4.14-5.80

                  





[facility] Center[facility]ukocyte count, jxppj4358-72-56 10:57:00* 



             Test Item    Value        Reference Range Interpretation Comments

 

             leukocyte count, blood (test code = 6690-2) 5.3 X10E3/UL 3.4-10.8  

                 





[facility]IE[facility] [id number] 20:07:00                    
                                                                 [place] [address]      Patient Name: TANISHA OLIVEIRA   
                               MR #: [id number]                     : 0
1975                                   Age/Sex: 43/M  Acct #: [id number] 
                            Req #: [id number]  Adm Physician:                   
                                  Ordered by: RODNEY KANG MD          
                 Report #: [id number]        Location: ER                        
             Room/Bed:                     _________________________________
__________________________________________________________________    Procedure:
[id number] DX/HAND 3+ VIEWS LEFT  Exam Date: 19                            
Exam Time:                                               REPORT STATUS: Sig
alexandra    HAND 3+ VIEWS LEFT       HISTORY:  Drill through the left index finger.. 
     COMPARISON: None available.           FINDINGS:   Bones:   No acute displa
vida fracture.     Osseous alignment is within normal limits.      Joints:   The 
joint spaces are well-maintained.      Soft tissues:   Bandage overlying the ind
ex finger.  No radiopaque foreign bodies.      IMPRESSION:    No acute bony abno
rmality.      Signed by: DR. Aidan Solis MD on 2019 8:08 PM        Dicta
jesusita By: AIDAN SOLIS MD  Electronically Signed By: AIDAN SOLIS MD on 19  Transcribed By: ALVERTO on 19       COPY TO:   ARTURO KANG MD        hemoglobin A1C, blood, as % of total xgxgkygfsr5566-23-69 
10:51:00* 



             Test Item    Value        Reference Range Interpretation Comments

 

                    hemoglobin A1C, blood, as % of total hemoglobin (test code =

 4548-4) 5.8 %               

4.8-5.6                   H                          





[facility] CenterLDL cholesterol, yhaad8113-02-17 10:51:00* 



             Test Item    Value        Reference Range Interpretation Comments

 

             LDL cholesterol, serum (test code = 2089-1) 155 mg/dL    0-99      

   H             





[facility] Centervery low density blvtpkbkwfza6537-01-36 10:51:00* 



             Test Item    Value        Reference Range Interpretation Comments

 

             very low density lipoproteins (test code = 2091-7) 39 mg/dL     5-4

0                       





[facility] CenterHDL cholesterol, ifudy5272-28-88 10:51:00* 



             Test Item    Value        Reference Range Interpretation Comments

 

             HDL cholesterol, serum (test code = 2085-9) 44 mg/dL     >39       

                 





[facility]ertriglyceride, serum, yrodqwe1770-16-24 10:51:00* 



             Test Item    Value        Reference Range Interpretation Comments

 

             triglyceride, serum, fasting (test code = 2571-8) 196 mg/dL    0-14

9        H             





[facility] Centercholesterol, ulvlg9243-09-01 10:51:00* 



             Test Item    Value        Reference Range Interpretation Comments

 

             cholesterol, serum (test code = 2093-3) 238 mg/dL    100-199      H

             





[facility] Centeralanine aminotransferase (SGPT), mixss4517-84-06 10:51:00
  * 



             Test Item    Value        Reference Range Interpretation Comments

 

             alanine aminotransferase (SGPT), serum (test code = 1742-6) 36 1/L 

      0-44                       





[facility] Centeraspartate aminotransferase (SGOT), djsux5808-97-92 
10:51:00* 



             Test Item    Value        Reference Range Interpretation Comments

 

             aspartate aminotransferase (SGOT), serum (test code = 1920-8) 25 1/

L       0-40                       





[facility] Centeralkaline phosphatase, advxe0327-30-57 10:51:00* 



             Test Item    Value        Reference Range Interpretation Comments

 

             alkaline phosphatase, serum (test code = 1783-0) 70 1/L       39-11

7                     





[facility]bilirubin, serum, pgdri8507-00-11 10:51:00* 



             Test Item    Value        Reference Range Interpretation Comments

 

             bilirubin, serum, total (test code = 1975-2) 0.2 mg/dL    0.0-1.2  

                  





[facility] Centeralbumin/globulin ratio, hwgfs2347-74-71 10:51:00* 



             Test Item    Value        Reference Range Interpretation Comments

 

             albumin/globulin ratio, serum (test code = 1759-0) 1.6          1.2

-2.2                    





[facility] Healthglobulin, ystpa3177-02-13 10:51:00* 



             Test Item    Value        Reference Range Interpretation Comments

 

             globulin, serum (test code = 2336-6) 2.7          1.5-4.5          

          





[facility] Healthalbumin, yynnn0286-23-31 10:51:00* 



             Test Item    Value        Reference Range Interpretation Comments

 

             albumin, serum (test code = 1751-7) 4.4 g/dL     3.5-5.5           

         





[facility] Healthprotein, total, sespf3047-64-18 10:51:00* 



             Test Item    Value        Reference Range Interpretation Comments

 

             protein, total, serum (test code = 2885-2) 7.1 g/dL     6.0-8.5    

                





[facility] Healthcalcium, boaon7422-98-46 10:51:00* 



             Test Item    Value        Reference Range Interpretation Comments

 

             calcium, serum (test code = 2000-8) 9.6 mg/dL    8.7-10.2          

         





[facility]ercarbon dioxide, venous atggv7922-90-95 10:51:00* 



             Test Item    Value        Reference Range Interpretation Comments

 

             carbon dioxide, venous blood (test code = 2027-1) 22 mmol/L    18-2

9                      





[facility] Healthchloride, btxbe9673-63-43 10:51:00* 



             Test Item    Value        Reference Range Interpretation Comments

 

             chloride, serum (test code = 2075-0) 96 mmol/L               

          





[facility] Healthpotassium, ppnid8194-89-33 10:51:00* 



             Test Item    Value        Reference Range Interpretation Comments

 

             potassium, serum (test code = 2823-3) 4.4 mmol/L   3.5-5.2         

           





[facility] Healthsodium, kvqmf5559-68-70 10:51:00* 



             Test Item    Value        Reference Range Interpretation Comments

 

             sodium, serum (test code = 2951-2) 136 mmol/L   134-144            

        





[facility] Healthurea nitrogen/creatinine ratio, rogtj5470-15-94 10:51:00
  * 



             Test Item    Value        Reference Range Interpretation Comments

 

             urea nitrogen/creatinine ratio, serum (test code = 3097-3) 13      

     9-20                       





[facility] HealtheGFR if African Mnamujem4936-44-00 10:51:00* 



             Test Item    Value        Reference Range Interpretation Comments

 

             eGFR if  (test code = 93935-5) 122 mL/min/((173/100

).m2) >59                        





[facility] CenterEstimated Glomerular Filtration Rate (calc)2018 
10:51:00* 



             Test Item    Value        Reference Range Interpretation Comments

 

                          Estimated Glomerular Filtration Rate (calc) (test code

 = 17187-6) 105 

mL/min/((173/100).m2) >59                                      





[facility] Centercreatinine, jcfdt6197-48-65 10:51:00* 



             Test Item    Value        Reference Range Interpretation Comments

 

             creatinine, serum (test code = 2160-0) 0.86 mg/dL   0.76-1.27      

            





[facility] of [facility] Centerurea nitrogen, tqqyk3471-13-49 10:51:00* 



             Test Item    Value        Reference Range Interpretation Comments

 

             urea nitrogen, blood (test code = 3094-0) 11 mg/dL     6-24        

               





[facility] Centerblood glucose, dvjtji2272-43-66 10:51:00* 



             Test Item    Value        Reference Range Interpretation Comments

 

             blood glucose, random (test code = 2339-0) 103 mg/dL    65-99      

  H             





[facility] Centerimmature granulocytes, percentage of total cells, blood
2018 10:51:00* 



             Test Item    Value        Reference Range Interpretation Comments

 

                          immature granulocytes, percentage of total cells, bloo

d (test code = 51971-2) 0 

%                                                            





[facility] Centerbasophil count, leodjgmt4580-82-83 10:51:00* 



             Test Item    Value        Reference Range Interpretation Comments

 

             basophil count, absolute (test code = 15476-0) 0.0 x10E3/uL 0.0-0.2

                    





[facility] CenterEosinophil Absolute Hapva2124-87-72 10:51:00* 



             Test Item    Value        Reference Range Interpretation Comments

 

             Eosinophil Absolute Count (test code = 59470-0) 0.2 X10E3/UL 0.0-0.

4                    





[facility] Centermonocyte count, blood, lwjxgebem5280-81-36 10:51:00* 



             Test Item    Value        Reference Range Interpretation Comments

 

             monocyte count, blood, automated (test code = 742-7) 0.4 X10E3/UL 0

.1-0.9                    





[facility] Centerlymphocyte count, blood, dzjevsfvq2973-63-58 10:51:00* 



             Test Item    Value        Reference Range Interpretation Comments

 

             lymphocyte count, blood, automated (test code = 731-0) 1.8 X10E3/UL

 0.7-3.1                    





[facility] CenterAbsolute Vqpgxitxxnr9639-11-59 10:51:00* 



             Test Item    Value        Reference Range Interpretation Comments

 

             Absolute Neutrophils (test code = 67387-9) 2.9 X10E3/UL 1.4-7.0    

                





[facility] Centerbasophils as percent of blood qbqjebbrzz2214-85-04 
10:51:00* 



             Test Item    Value        Reference Range Interpretation Comments

 

             basophils as percent of blood leukocytes (test code = 707-0) 1 %   

                                  





[facility]ereosinophils as percent of blood cnvtdxkkfk0119-53-13 
10:51:00* 



             Test Item    Value        Reference Range Interpretation Comments

 

             eosinophils as percent of blood leukocytes (test code = 713-8) 4 % 

                                    





[facility] of [facility] Centermonocytes as percent of blood pdcnjjjarg3594-12-25 
10:51:00* 



             Test Item    Value        Reference Range Interpretation Comments

 

             monocytes as percent of blood leukocytes (test code = 5905-5) 8 %  

                                   





[facility] Centerlymphocytes as percent of blood uskuuxzdtf8630-84-33 
10:51:00* 



             Test Item    Value        Reference Range Interpretation Comments

 

             lymphocytes as percent of blood leukocytes (test code = 736-9) 33 %

                                    





[facility] Centerneutrophils as percent of blood svzvgymsme5183-32-14 
10:51:00* 



             Test Item    Value        Reference Range Interpretation Comments

 

             neutrophils as percent of blood leukocytes (test code = 770-8) 54 %

                                    





[facility] Centerplatelet ahpca0180-86-03 10:51:00* 



             Test Item    Value        Reference Range Interpretation Comments

 

             platelet count (test code = 777-3) 290 X10E3/-379            

        





[facility] Centerred blood cell distribution lohhg1230-30-48 10:51:00* 



             Test Item    Value        Reference Range Interpretation Comments

 

             red blood cell distribution width (test code = 788-0) 13.2 %       

12.3-15.4                  





On license of [facility] Centermean corpuscular hemoglobin concentration, CDP3982-81-85 
10:51:00* 



             Test Item    Value        Reference Range Interpretation Comments

 

                    mean corpuscular hemoglobin concentration, RBC (test code = 

786-4) 34.3 G/DL           

31.5-35.7                                            





[facility], [id number] 10:51:00* 



             Test Item    Value        Reference Range Interpretation Comments

 

             mean corpuscular hemoglobin, RBC (test code = 785-6) 30.2 pg      2

6.6-33.0                  





[facility] corpus[facility] volume, IIO2152-66-56 10:51:00* 



             Test Item    Value        Reference Range Interpretation Comments

 

             mean corpuscular volume, RBC (test code = 787-2) 88 fL        79-97

                      





[facility] Centerhematocrit, hsams9451-32-29 10:51:00* 



             Test Item    Value        Reference Range Interpretation Comments

 

             hematocrit, blood (test code = 4544-3) 43.4 %       37.5-51.0      

            





[facility] Centerhemoglobin, quwwv3595-96-28 10:51:00* 



             Test Item    Value        Reference Range Interpretation Comments

 

             hemoglobin, blood (test code = 718-7) 14.9 g/dL    13.0-17.7       

           





[facility] of [facility] Centererythrocyte (RBC) xchka0707-87-04 10:51:00* 



             Test Item    Value        Reference Range Interpretation Comments

 

             erythrocyte (RBC) count (test code = 789-8) 4.93 X10E6/UL 4.14-5.80

                  





[facility] Centerleukocyte count, tuujb1879-68-87 10:51:00* 



             Test Item    Value        Reference Range Interpretation Comments

 

             leukocyte count, blood (test code = 6690-2) 5.3 X10E3/UL 3.4-10.8  

                 





[facility]hthyroid stimulating hormone, mmicz9359-19-58 12:02:00* 



             Test Item    Value        Reference Range Interpretation Comments

 

                    thyroid stimulating hormone, serum (test code = 3016-3) 1.61

0 u[iU]/mL      

0.450-4.500                                          





[facility] Centerhemoglobin A1C, blood, as % of total ahbavblcgc7688-01-84
 12:02:00* 



             Test Item    Value        Reference Range Interpretation Comments

 

                    hemoglobin A1C, blood, as % of total hemoglobin (test code =

 4548-4) 5.7 %               

4.8-5.6                   H                          





[facility] CenterLDL cholesterol, omidl3209-64-49 12:02:00* 



             Test Item    Value        Reference Range Interpretation Comments

 

             LDL cholesterol, serum (test code = 2089-1) 156 mg/dL    0-99      

   H             





[facility] Centervery low density feubozbqorkh5962-91-50 12:02:00* 



             Test Item    Value        Reference Range Interpretation Comments

 

             very low density lipoproteins (test code = 2091-7) 45 mg/dL     5-4

0         H             





[facility] CenterHDL cholesterol, cpuos5000-30-34 12:02:00* 



             Test Item    Value        Reference Range Interpretation Comments

 

             HDL cholesterol, serum (test code = 2085-9) 47 mg/dL     >39       

                 





[facility] Centertriglyceride, serum, tuetjoz4480-57-96 12:02:00* 



             Test Item    Value        Reference Range Interpretation Comments

 

             triglyceride, serum, fasting (test code = 2571-8) 224 mg/dL    0-14

9        H             





[facility] Centercholesterol, tqvqt8150-81-36 12:02:00* 



             Test Item    Value        Reference Range Interpretation Comments

 

             cholesterol, serum (test code = 2093-3) 248 mg/dL    100-199      H

             





[facility] of [facility] Centeralanine aminotransferase (SGPT), mxqsg7899-60-90 12:02:00
  * 



             Test Item    Value        Reference Range Interpretation Comments

 

             alanine aminotransferase (SGPT), serum (test code = 1742-6) 45 1/L 

      0-44         H             





[facility] Centeraspartate aminotransferase (SGOT), bplmb1370-65-18 
12:02:00* 



             Test Item    Value        Reference Range Interpretation Comments

 

             aspartate aminotransferase (SGOT), serum (test code = 1920-8) 27 1/

L       0-40                       





[facility] Centeralkaline phosphatase, vwaey6986-50-26 12:02:00* 



             Test Item    Value        Reference Range Interpretation Comments

 

             alkaline phosphatase, serum (test code = 1783-0) 76 1/L       39-11

7                     





[facility] Centerbilirubin, serum, tbbln1630-87-34 12:02:00* 



             Test Item    Value        Reference Range Interpretation Comments

 

             bilirubin, serum, total (test code = 1975-2) 0.4 mg/dL    0.0-1.2  

                  





[facility] Centeralbumin/globulin ratio, ogeuv2275-28-12 12:02:00* 



             Test Item    Value        Reference Range Interpretation Comments

 

             albumin/globulin ratio, serum (test code = 1759-0) 1.8          1.2

-2.2                    





[facility] Centerglobulin, atdti6660-19-77 12:02:00* 



             Test Item    Value        Reference Range Interpretation Comments

 

             globulin, serum (test code = 2336-6) 2.8          1.5-4.5          

          





[facility] Healthalbumin, zedry3674-21-94 12:02:00* 



             Test Item    Value        Reference Range Interpretation Comments

 

             albumin, serum (test code = 1751-7) 4.9 g/dL     3.5-5.5           

         





[facility] Healthprotein, total, xeyav2026-26-96 12:02:00* 



             Test Item    Value        Reference Range Interpretation Comments

 

             protein, total, serum (test code = 2885-2) 7.7 g/dL     6.0-8.5    

                





[facility] Healthcalcium, hbtvc3334-07-54 12:02:00* 



             Test Item    Value        Reference Range Interpretation Comments

 

             calcium, serum (test code = 2000-8) 9.6 mg/dL    8.7-10.2          

         





[facility] Centercarbon dioxide, venous kkqpy8408-21-37 12:02:00* 



             Test Item    Value        Reference Range Interpretation Comments

 

             carbon dioxide, venous blood (test code = 2027-1) 22 mmol/L    18-2

9                      





[facility] Centerchloride, owodi7108-28-74 12:02:00* 



             Test Item    Value        Reference Range Interpretation Comments

 

             chloride, serum (test code = 2075-0) 97 mmol/L               

          





[facility] Centerpotassium, kzoyf3134-77-77 12:02:00* 



             Test Item    Value        Reference Range Interpretation Comments

 

             potassium, serum (test code = 2823-3) 4.6 mmol/L   3.5-5.2         

           





[facility] Centersodium, xusmk8192-69-52 12:02:00* 



             Test Item    Value        Reference Range Interpretation Comments

 

             sodium, serum (test code = 2951-2) 138 mmol/L   134-144            

        





[facility] Centerurea nitrogen/creatinine ratio, tmhsr2205-83-85 12:02:00
  * 



             Test Item    Value        Reference Range Interpretation Comments

 

             urea nitrogen/creatinine ratio, serum (test code = 3097-3) 11      

     9-20                       





[facility] HealtheGFR if African Vscfpzzd3083-94-20 12:02:00* 



             Test Item    Value        Reference Range Interpretation Comments

 

             eGFR if  (test code = 71601-4) 125 mL/min/((173/100

).m2) >59                        





[facility] HealthEstimated Glomerular Filtration Rate (calc)2017 
12:02:00* 



             Test Item    Value        Reference Range Interpretation Comments

 

                          Estimated Glomerular Filtration Rate (calc) (test code

 = 00531-9) 108 

mL/min/((173/100).m2) >59                                      





[facility]ercreatinine, owabi9258-46-48 12:02:00* 



             Test Item    Value        Reference Range Interpretation Comments

 

             creatinine, serum (test code = 2160-0) 0.81 mg/dL   0.76-1.27      

            





[facility] [id number] 12:02:00* 



             Test Item    Value        Reference Range Interpretation Comments

 

             urea nitrogen, blood (test code = 3094-0) 9 mg/dL      6-24        

               





[facility] [id number] 12:02:00* 



             Test Item    Value        Reference Range Interpretation Comments

 

             blood glucose, random (test code = 2339-0) 90 mg/dL     65-99      

                





On license of [facility]

## 2020-11-30 VITALS — SYSTOLIC BLOOD PRESSURE: 135 MMHG | DIASTOLIC BLOOD PRESSURE: 85 MMHG

## 2020-11-30 VITALS — SYSTOLIC BLOOD PRESSURE: 115 MMHG | DIASTOLIC BLOOD PRESSURE: 69 MMHG

## 2020-11-30 VITALS — SYSTOLIC BLOOD PRESSURE: 116 MMHG | DIASTOLIC BLOOD PRESSURE: 78 MMHG

## 2020-11-30 VITALS — DIASTOLIC BLOOD PRESSURE: 77 MMHG | SYSTOLIC BLOOD PRESSURE: 111 MMHG

## 2020-11-30 VITALS — DIASTOLIC BLOOD PRESSURE: 75 MMHG | SYSTOLIC BLOOD PRESSURE: 112 MMHG

## 2020-11-30 VITALS — SYSTOLIC BLOOD PRESSURE: 111 MMHG | DIASTOLIC BLOOD PRESSURE: 77 MMHG

## 2020-11-30 VITALS — SYSTOLIC BLOOD PRESSURE: 118 MMHG | DIASTOLIC BLOOD PRESSURE: 87 MMHG

## 2020-11-30 LAB
ANION GAP SERPL CALC-SCNC: 10 MMOL/L (ref 8–16)
BASOPHILS # BLD AUTO: 0 10*3/UL (ref 0–0.1)
BASOPHILS NFR BLD AUTO: 0.5 % (ref 0–1)
BUN SERPL-MCNC: 7 MG/DL (ref 7–26)
BUN/CREAT SERPL: 7 (ref 6–25)
CALCIUM SERPL-MCNC: 9 MG/DL (ref 8.4–10.2)
CHLORIDE SERPL-SCNC: 105 MMOL/L (ref 98–107)
CO2 SERPL-SCNC: 30 MMOL/L (ref 22–29)
DEPRECATED NEUTROPHILS # BLD AUTO: 3.7 10*3/UL (ref 2.1–6.9)
EGFRCR SERPLBLD CKD-EPI 2021: > 60 ML/MIN (ref 60–?)
EOSINOPHIL # BLD AUTO: 0.2 10*3/UL (ref 0–0.4)
EOSINOPHIL NFR BLD AUTO: 2.8 % (ref 0–6)
ERYTHROCYTE [DISTWIDTH] IN CORD BLOOD: 12.7 % (ref 11.7–14.4)
GLUCOSE SERPLBLD-MCNC: 118 MG/DL (ref 74–118)
HCT VFR BLD AUTO: 40.9 % (ref 38.2–49.6)
HGB BLD-MCNC: 13.5 G/DL (ref 14–18)
LYMPHOCYTES # BLD: 1.3 10*3/UL (ref 1–3.2)
LYMPHOCYTES NFR BLD AUTO: 21.9 % (ref 18–39.1)
MCH RBC QN AUTO: 30.7 PG (ref 28–32)
MCHC RBC AUTO-ENTMCNC: 33 G/DL (ref 31–35)
MCV RBC AUTO: 93 FL (ref 81–99)
MONOCYTES # BLD AUTO: 0.6 10*3/UL (ref 0.2–0.8)
MONOCYTES NFR BLD AUTO: 10.3 % (ref 4.4–11.3)
NEUTS SEG NFR BLD AUTO: 64.1 % (ref 38.7–80)
PLATELET # BLD AUTO: 227 X10E3/UL (ref 140–360)
POTASSIUM SERPL-SCNC: 5 MMOL/L (ref 3.5–5.1)
RBC # BLD AUTO: 4.4 X10E6/UL (ref 4.3–5.7)
SODIUM SERPL-SCNC: 140 MMOL/L (ref 136–145)

## 2020-11-30 RX ADMIN — SODIUM CHLORIDE SCH MLS/HR: 9 INJECTION, SOLUTION INTRAVENOUS at 20:19

## 2020-11-30 RX ADMIN — CLINDAMYCIN PHOSPHATE SCH MLS/HR: 6 INJECTION, SOLUTION INTRAVENOUS at 23:11

## 2020-11-30 RX ADMIN — ATORVASTATIN CALCIUM SCH MG: 20 TABLET, FILM COATED ORAL at 20:39

## 2020-11-30 RX ADMIN — Medication PRN MG: at 05:12

## 2020-11-30 RX ADMIN — TAZOBACTAM SODIUM AND PIPERACILLIN SODIUM SCH MLS/HR: 375; 3 INJECTION, SOLUTION INTRAVENOUS at 17:59

## 2020-11-30 RX ADMIN — TAZOBACTAM SODIUM AND PIPERACILLIN SODIUM SCH MLS/HR: 375; 3 INJECTION, SOLUTION INTRAVENOUS at 00:23

## 2020-11-30 RX ADMIN — CLINDAMYCIN PHOSPHATE SCH MLS/HR: 6 INJECTION, SOLUTION INTRAVENOUS at 05:13

## 2020-11-30 RX ADMIN — Medication PRN MG: at 00:33

## 2020-11-30 RX ADMIN — VANCOMYCIN HYDROCHLORIDE SCH MLS/HR: 1 INJECTION, SOLUTION INTRAVENOUS at 09:59

## 2020-11-30 RX ADMIN — TAZOBACTAM SODIUM AND PIPERACILLIN SODIUM SCH MLS/HR: 375; 3 INJECTION, SOLUTION INTRAVENOUS at 12:22

## 2020-11-30 RX ADMIN — VANCOMYCIN HYDROCHLORIDE SCH MLS/HR: 1 INJECTION, SOLUTION INTRAVENOUS at 21:55

## 2020-11-30 RX ADMIN — SODIUM CHLORIDE PRN MG: 900 INJECTION INTRAVENOUS at 20:40

## 2020-11-30 RX ADMIN — CLONAZEPAM PRN MG: 1 TABLET ORAL at 12:35

## 2020-11-30 RX ADMIN — CLINDAMYCIN PHOSPHATE SCH MLS/HR: 6 INJECTION, SOLUTION INTRAVENOUS at 14:07

## 2020-11-30 RX ADMIN — Medication PRN MG: at 20:39

## 2020-11-30 RX ADMIN — SODIUM CHLORIDE PRN MG: 900 INJECTION INTRAVENOUS at 00:33

## 2020-11-30 RX ADMIN — SODIUM CHLORIDE PRN MG: 900 INJECTION INTRAVENOUS at 05:12

## 2020-11-30 RX ADMIN — TAZOBACTAM SODIUM AND PIPERACILLIN SODIUM SCH MLS/HR: 375; 3 INJECTION, SOLUTION INTRAVENOUS at 06:03

## 2020-11-30 NOTE — NUR
IM- progress note

O/N see below



ROS; no f/c/s/n/V/D/HA/cp/sob/confusion/dizziness/vision changes/leg pain



v/s; revd

PE

tired appearing

anicteric; LEFT FACIAL AT [place] [facility] WITH 
ERYTHEMA/INDURATION/TENDERNESS/WARMTH; no skin breakdown/discharge

ns1s2

mod bs

soft nt nd 

no e/t

skin dry

n. affect



labs/meds revd



A/P: 

Cellulitis of face- IV abx; cultures

Furuncle of face- Possible abscess; IV abx; f/u imaging; 

Obesity- screen for DM; check lipids

BMI 31.5- as above

Prop; scd

DIpso: f/u labs; 



11-30 cont care; CT revd.



MANFRED FUNEZ MD, PHD.

## 2020-11-30 NOTE — NUR
Pt. expressed no spiritual or emotional concerns at this time.  provided hospitality 
and information on how to reach , if needed. No need to follow at this time.



NGA ALBRIGHT



Spiritual Care Department

O: [phone]

## 2020-11-30 NOTE — NUR
Bedside report and walking rounds completed with on coming nurse. Patient in bed with call 
light within reach. Bed in locked and lowest position. No issues or concerns noted. Patient 
updated on POC.

## 2020-11-30 NOTE — NUR
Resumed care of patient. Patient awake and resting in bed, no s/s of distress at this time. 
Bed locked and in lowest position, side rails upx2, call light placed within reach. Patient 
instructed to call for assistance if needed, verbalized understanding. All safety measures 
in place.

## 2020-12-01 VITALS — SYSTOLIC BLOOD PRESSURE: 103 MMHG | DIASTOLIC BLOOD PRESSURE: 73 MMHG

## 2020-12-01 VITALS — SYSTOLIC BLOOD PRESSURE: 118 MMHG | DIASTOLIC BLOOD PRESSURE: 73 MMHG

## 2020-12-01 VITALS — SYSTOLIC BLOOD PRESSURE: 113 MMHG | DIASTOLIC BLOOD PRESSURE: 75 MMHG

## 2020-12-01 VITALS — SYSTOLIC BLOOD PRESSURE: 128 MMHG | DIASTOLIC BLOOD PRESSURE: 84 MMHG

## 2020-12-01 VITALS — SYSTOLIC BLOOD PRESSURE: 140 MMHG | DIASTOLIC BLOOD PRESSURE: 97 MMHG

## 2020-12-01 VITALS — SYSTOLIC BLOOD PRESSURE: 136 MMHG | DIASTOLIC BLOOD PRESSURE: 83 MMHG

## 2020-12-01 RX ADMIN — Medication PRN MG: at 05:17

## 2020-12-01 RX ADMIN — Medication PRN MG: at 00:52

## 2020-12-01 RX ADMIN — CEFEPIME HYDROCHLORIDE SCH MLS/HR: 1 INJECTION, POWDER, FOR SOLUTION INTRAMUSCULAR; INTRAVENOUS at 08:56

## 2020-12-01 RX ADMIN — TAZOBACTAM SODIUM AND PIPERACILLIN SODIUM SCH MLS/HR: 375; 3 INJECTION, SOLUTION INTRAVENOUS at 00:52

## 2020-12-01 RX ADMIN — CLINDAMYCIN PHOSPHATE SCH MLS/HR: 6 INJECTION, SOLUTION INTRAVENOUS at 16:20

## 2020-12-01 RX ADMIN — CLINDAMYCIN PHOSPHATE SCH MLS/HR: 6 INJECTION, SOLUTION INTRAVENOUS at 05:11

## 2020-12-01 RX ADMIN — Medication PRN MG: at 20:41

## 2020-12-01 RX ADMIN — LISINOPRIL SCH MG: 10 TABLET ORAL at 08:56

## 2020-12-01 RX ADMIN — VANCOMYCIN HYDROCHLORIDE SCH MLS/HR: 1 INJECTION, SOLUTION INTRAVENOUS at 21:00

## 2020-12-01 RX ADMIN — TAZOBACTAM SODIUM AND PIPERACILLIN SODIUM SCH MLS/HR: 375; 3 INJECTION, SOLUTION INTRAVENOUS at 06:08

## 2020-12-01 RX ADMIN — ATORVASTATIN CALCIUM SCH MG: 20 TABLET, FILM COATED ORAL at 22:25

## 2020-12-01 RX ADMIN — CLINDAMYCIN PHOSPHATE SCH MLS/HR: 6 INJECTION, SOLUTION INTRAVENOUS at 22:26

## 2020-12-01 RX ADMIN — SODIUM CHLORIDE PRN MG: 900 INJECTION INTRAVENOUS at 00:52

## 2020-12-01 RX ADMIN — VANCOMYCIN HYDROCHLORIDE SCH MLS/HR: 1 INJECTION, SOLUTION INTRAVENOUS at 13:43

## 2020-12-01 RX ADMIN — SODIUM CHLORIDE SCH MLS/HR: 9 INJECTION, SOLUTION INTRAVENOUS at 13:10

## 2020-12-01 RX ADMIN — SODIUM CHLORIDE PRN MG: 900 INJECTION INTRAVENOUS at 05:17

## 2020-12-01 RX ADMIN — PAROXETINE HYDROCHLORIDE HEMIHYDRATE SCH MG: 20 TABLET, FILM COATED ORAL at 08:56

## 2020-12-01 NOTE — CONSULTATION
DATE OF CONSULTATION:  12/01/2020  

 

HISTORY OF PRESENT ILLNESS:  The patient is a 45-year-old male, history of hypertension,

hyperlipidemia, presents with complaints of pain and swelling on the left side of his

face, which he had for about 6 days now.  He has been admitted to the hospital, started

on antibiotics, but swelling has persisted.  CT of the face was done, which revealed no

definite abscess, just inflammation, but now seems to be coalescing into an abscess.  He

has not had any fever. 

 

PAST MEDICAL HISTORY:  Significant hypertension, anxiety, and hyperlipidemia.  He has

had previous cholecystectomy. 

 

MEDICATIONS:  At home were Lipitor, clonazepam, lisinopril, and paroxetine.

 

ALLERGIES:  HE HAS NO KNOWN ALLERGIES.

 

FAMILY HISTORY:  Noncontributory.

 

SOCIAL HISTORY:  The patient is .  He does not smoke cigarettes or drink alcohol.

 

REVIEW OF SYSTEMS:

As stated above, otherwise was negative.

 

PHYSICAL EXAMINATION:

GENERAL:  The patient is awake and alert. 

VITAL SIGNS:  Normal. 

HEENT:  There is erythema and swelling below the left eye with question of fluctuance.

There is no necrotic tissue. 

NECK:  Has no masses. 

LUNGS:  Equal breath sounds are clear bilaterally. 

CARDIAC:  Regular rate and rhythm with no murmur. 

ABDOMEN:  Soft without tenderness or mass. 

EXTREMITIES:  Have no edema. 

NEUROLOGIC:  Intact.

ASSESSMENT:  A 45-year-old male with cellulitis of the left side of his face, is now

developing into an abscess. 

 

PLAN:  Incision and drainage to be done in the operating room tomorrow.  Procedure was

explained to the patient. 

 

Thank you for asking me to see Mr. Javon Cabello.

 

 

 

 

______________________________

MD TIANA Underwood/MODL

D:  12/01/2020 17:13:35

T:  12/01/2020 18:12:04

Job #:  [id number]

## 2020-12-01 NOTE — NUR
Bedside report given to oncoming nurse. Patient awake and resting in bed, no s/s of distress 
at this time. All safety measures in place.

## 2020-12-01 NOTE — NUR
IM- progress note

O/N see below



ROS; no f/c/s/n/V/D/HA/cp/sob/confusion/dizziness/vision changes/leg pain



v/s; revd

PE

tired appearing

anicteric; LEFT FACIAL AT [place] [facility] WITH 
ERYTHEMA/INDURATION/TENDERNESS/WARMTH; no skin breakdown/discharge

ns1s2

mod bs

soft nt nd 

no e/t

skin dry

n. affect



labs/meds revd



A/P: 

Cellulitis of face- IV abx; cultures

Furuncle of face- Possible abscess; IV abx; f/u imaging; 

Obesity- screen for DM; check lipids

BMI 31.5- as above

Prop; scd

DIpso: f/u labs; 



11-30 cont care; CT revd.

12-1 cont care.



MANFRED FUNEZ MD, PHD.

## 2020-12-01 NOTE — NUR
ASSESSMENT: Spiritual distress

Referred by [employer] employee. Pt overwhelmed by complicated marital issues. Pt states his wife 
of 13 years left him since his hospitalization. Pt expressed emotions thru words and tears. 
Pt states she moved out of their apartment and broke their contract. He also states he 
cannot afford to stay there on his disability income. Pt expressed that their primary 
disagreement concerns their different Buddhism beliefs. Pt states he has history of 
depression. Pt trying to decide whether to move to [place] with his son or [place] with his 
brother/family. Pt states his negro is important to him.



Intervention:  Provided unhurried empathic listening and prayer. Followed up w/ RN. Will 
consult SW/CM for resources.



Outcome: Pt expressed appreciation for support. Will follow as able.





NGA ALBRIGHT



Spiritual Care Department

O: [phone]

## 2020-12-02 VITALS — SYSTOLIC BLOOD PRESSURE: 116 MMHG | DIASTOLIC BLOOD PRESSURE: 78 MMHG

## 2020-12-02 VITALS — DIASTOLIC BLOOD PRESSURE: 85 MMHG | SYSTOLIC BLOOD PRESSURE: 138 MMHG

## 2020-12-02 VITALS — DIASTOLIC BLOOD PRESSURE: 74 MMHG | SYSTOLIC BLOOD PRESSURE: 121 MMHG

## 2020-12-02 VITALS — DIASTOLIC BLOOD PRESSURE: 83 MMHG | SYSTOLIC BLOOD PRESSURE: 128 MMHG

## 2020-12-02 VITALS — DIASTOLIC BLOOD PRESSURE: 78 MMHG | SYSTOLIC BLOOD PRESSURE: 126 MMHG

## 2020-12-02 VITALS — SYSTOLIC BLOOD PRESSURE: 120 MMHG | DIASTOLIC BLOOD PRESSURE: 76 MMHG

## 2020-12-02 PROCEDURE — 0J910ZZ DRAINAGE OF FACE SUBCUTANEOUS TISSUE AND FASCIA, OPEN APPROACH: ICD-10-PCS | Performed by: SURGERY

## 2020-12-02 RX ADMIN — VANCOMYCIN HYDROCHLORIDE SCH MLS/HR: 1 INJECTION, SOLUTION INTRAVENOUS at 21:00

## 2020-12-02 RX ADMIN — HYDROCODONE BITARTRATE AND ACETAMINOPHEN PRN EA: 5; 325 TABLET ORAL at 22:15

## 2020-12-02 RX ADMIN — ZOLPIDEM TARTRATE PRN MG: 5 TABLET, FILM COATED ORAL at 21:27

## 2020-12-02 RX ADMIN — LISINOPRIL SCH MG: 10 TABLET ORAL at 08:00

## 2020-12-02 RX ADMIN — SODIUM CHLORIDE SCH MLS/HR: 9 INJECTION, SOLUTION INTRAVENOUS at 09:21

## 2020-12-02 RX ADMIN — CLINDAMYCIN PHOSPHATE SCH MLS/HR: 6 INJECTION, SOLUTION INTRAVENOUS at 13:04

## 2020-12-02 RX ADMIN — ATORVASTATIN CALCIUM SCH MG: 20 TABLET, FILM COATED ORAL at 21:42

## 2020-12-02 RX ADMIN — CLINDAMYCIN PHOSPHATE SCH MLS/HR: 6 INJECTION, SOLUTION INTRAVENOUS at 22:52

## 2020-12-02 RX ADMIN — SODIUM CHLORIDE SCH MLS/HR: 9 INJECTION, SOLUTION INTRAVENOUS at 01:30

## 2020-12-02 RX ADMIN — CEFEPIME HYDROCHLORIDE SCH MLS/HR: 1 INJECTION, POWDER, FOR SOLUTION INTRAMUSCULAR; INTRAVENOUS at 06:29

## 2020-12-02 RX ADMIN — PAROXETINE HYDROCHLORIDE HEMIHYDRATE SCH MG: 20 TABLET, FILM COATED ORAL at 08:00

## 2020-12-02 RX ADMIN — CLINDAMYCIN PHOSPHATE SCH MLS/HR: 6 INJECTION, SOLUTION INTRAVENOUS at 05:08

## 2020-12-02 RX ADMIN — SODIUM CHLORIDE SCH MLS/HR: 9 INJECTION, SOLUTION INTRAVENOUS at 21:42

## 2020-12-02 RX ADMIN — ZOLPIDEM TARTRATE PRN MG: 5 TABLET, FILM COATED ORAL at 00:15

## 2020-12-02 RX ADMIN — CLONAZEPAM PRN MG: 1 TABLET ORAL at 18:22

## 2020-12-02 RX ADMIN — VANCOMYCIN HYDROCHLORIDE SCH MLS/HR: 1 INJECTION, SOLUTION INTRAVENOUS at 09:16

## 2020-12-02 RX ADMIN — HYDROCODONE BITARTRATE AND ACETAMINOPHEN PRN EA: 5; 325 TABLET ORAL at 18:22

## 2020-12-02 NOTE — NUR
BEDSIDE SHIFT REPORT RECEIVED FROM THE NIGHT SHIFT RN. EDUCATED PT ABOUT FALL PRECAUTIONS. 
PT VERBALIZED UNDERSTANDING. CALL LIGHT WITH IN EASY REACH. BED IS LOW AND LOCKED. SIDE 
RAILS X2. ALL SAFETY MEASURES IN PLACE. PT IS ON NPO. PT DENIES NEEDS AT THIS TIME.

## 2020-12-02 NOTE — NUR
Pt en-route for procedure. Will follow up as able.





NGA ALBRIGHT



Spiritual Care Department

O: [phone]

## 2020-12-02 NOTE — NUR
RECEIVED PT IN BED AOX3 ,PT HAD I& D DONE RT SIDE OF THE FACE WITH DRESSING .LEFT AC 20G NS 
AT 100CC/HR ,CALL LIGHT WITH IN REACH ,CONTINUE TO MONITOR

## 2020-12-02 NOTE — NUR
IM- progress note

O/N see below



ROS; no f/c/s/n/V/D/HA/cp/sob/confusion/dizziness/vision changes/leg pain



v/s; revd

PE

tired appearing

anicteric; LEFT FACIAL AT [place] [facility] WITH 
ERYTHEMA/INDURATION/TENDERNESS/WARMTH; no skin breakdown/discharge

ns1s2

mod bs

soft nt nd 

no e/t

skin dry

n. affect



labs/meds revd



A/P: 

Cellulitis of face- IV abx; cultures

Furuncle of face- Possible abscess; IV abx; f/u imaging; 

Obesity- screen for DM; check lipids

BMI 31.5- as above

Prop; scd

DIpso: f/u labs; 



11-30 cont care; CT revd.

12-1 cont care.

12-2 cont IV abx; I&D pending; 



MANFRED FUNEZ MD, PHD.

## 2020-12-02 NOTE — NUR
PT IS BACK TO THE UNIT AFTER PROCEDURE. S/P I&D LEFT SIDE FACE DRESSING CDI. PT DENIES NEEDS 
AT THIS TIME.

## 2020-12-02 NOTE — NUR
Pt sleeping soundly and no family present.  



NGA ALBRIGHT



Spiritual Care Department

O: [phone]

## 2020-12-02 NOTE — OPERATIVE REPORT
DATE OF PROCEDURE:  12/02/2020

 

SURGEON:  Eladio Frazier MD

 

PREOPERATIVE DIAGNOSIS:  Abscess, left zygomatic area.

 

POSTOPERATIVE DIAGNOSIS:  Abscess, left zygomatic area.

 

PROCEDURE:  Incision and drainage of abscess, left zygomatic area.

 

ASSISTANT:  None.

 

ANESTHESIA:  General.

 

INDICATIONS AND FINDINGS:  The patient is a 45-year-old male admitted to the hospital

with complaints of pain and swelling on the left side of his face, which is convalescent

formed area of an abscess.  At Surgery, there was an abscess within the subcutaneous

tissue at the zygomatic area, which contributed about 2 mL of purulent fluid that was

drained.  There was no necrotic tissue. 

 

TECHNIQUE:  After adequate general endotracheal anesthesia, the patient in supine

position, the left side of the face and the zygomatic area was prepped and draped in

sterile fashion with Betadine solution.  Transverse incision was made over the area of

swelling and fluctuance, and abscess cavity was entered.  Purulent fluid was drained.

About 2 mL of purulent fluid was drained.  Sample was taken for culture and sensitivity.

 The abscess cavity was irrigated with saline.  Hemostasis achieved with electrocautery.

 It was then packed open with quarter-inch iodoform gauze and sterile dressing applied.

The patient tolerated the procedure well.  Estimated blood loss was 10 mL.  There were

no complications.  All counts were correct.  The patient was taken to the recovery room

in satisfactory condition. 

 

 

 

 

______________________________

Eladio Frazier MD

 

DWG/MODL

D:  12/02/2020 14:54:57

T:  12/02/2020 15:14:04

Job #:  [id number]

 

cc:            Francisco De Guzman MD

## 2020-12-02 NOTE — NUR
CALL RECEIVED FROM [facility] REGARDING WOUND CULTURE. PAGED DR. FUNEZ AND REPORTED THE 
SAME. WOUND CULTURE AT I&D TODAY AS PER THE DR. INFORMED THE SAME TO LAB.

## 2020-12-03 VITALS — SYSTOLIC BLOOD PRESSURE: 122 MMHG | DIASTOLIC BLOOD PRESSURE: 71 MMHG

## 2020-12-03 VITALS — SYSTOLIC BLOOD PRESSURE: 125 MMHG | DIASTOLIC BLOOD PRESSURE: 69 MMHG

## 2020-12-03 VITALS — SYSTOLIC BLOOD PRESSURE: 108 MMHG | DIASTOLIC BLOOD PRESSURE: 80 MMHG

## 2020-12-03 VITALS — DIASTOLIC BLOOD PRESSURE: 80 MMHG | SYSTOLIC BLOOD PRESSURE: 154 MMHG

## 2020-12-03 VITALS — DIASTOLIC BLOOD PRESSURE: 79 MMHG | SYSTOLIC BLOOD PRESSURE: 120 MMHG

## 2020-12-03 VITALS — DIASTOLIC BLOOD PRESSURE: 87 MMHG | SYSTOLIC BLOOD PRESSURE: 119 MMHG

## 2020-12-03 LAB
ANION GAP SERPL CALC-SCNC: 14.7 MMOL/L (ref 8–16)
BASOPHILS # BLD AUTO: 0.1 10*3/UL (ref 0–0.1)
BASOPHILS NFR BLD AUTO: 0.6 % (ref 0–1)
BUN SERPL-MCNC: 9 MG/DL (ref 7–26)
BUN/CREAT SERPL: 10 (ref 6–25)
CALCIUM SERPL-MCNC: 9.6 MG/DL (ref 8.4–10.2)
CHLORIDE SERPL-SCNC: 107 MMOL/L (ref 98–107)
CO2 SERPL-SCNC: 24 MMOL/L (ref 22–29)
DEPRECATED NEUTROPHILS # BLD AUTO: 6 10*3/UL (ref 2.1–6.9)
EGFRCR SERPLBLD CKD-EPI 2021: > 60 ML/MIN (ref 60–?)
EOSINOPHIL # BLD AUTO: 0.1 10*3/UL (ref 0–0.4)
EOSINOPHIL NFR BLD AUTO: 1.6 % (ref 0–6)
ERYTHROCYTE [DISTWIDTH] IN CORD BLOOD: 12.7 % (ref 11.7–14.4)
GLUCOSE SERPLBLD-MCNC: 169 MG/DL (ref 74–118)
HCT VFR BLD AUTO: 39.6 % (ref 38.2–49.6)
HGB BLD-MCNC: 12.7 G/DL (ref 14–18)
LYMPHOCYTES # BLD: 1.7 10*3/UL (ref 1–3.2)
LYMPHOCYTES NFR BLD AUTO: 20.8 % (ref 18–39.1)
MCH RBC QN AUTO: 29.5 PG (ref 28–32)
MCHC RBC AUTO-ENTMCNC: 32.1 G/DL (ref 31–35)
MCV RBC AUTO: 91.9 FL (ref 81–99)
MONOCYTES # BLD AUTO: 0.4 10*3/UL (ref 0.2–0.8)
MONOCYTES NFR BLD AUTO: 5.2 % (ref 4.4–11.3)
NEUTS SEG NFR BLD AUTO: 71.6 % (ref 38.7–80)
PLATELET # BLD AUTO: 276 X10E3/UL (ref 140–360)
POTASSIUM SERPL-SCNC: 4.7 MMOL/L (ref 3.5–5.1)
RBC # BLD AUTO: 4.31 X10E6/UL (ref 4.3–5.7)
SODIUM SERPL-SCNC: 141 MMOL/L (ref 136–145)

## 2020-12-03 RX ADMIN — ZOLPIDEM TARTRATE PRN MG: 5 TABLET, FILM COATED ORAL at 22:32

## 2020-12-03 RX ADMIN — CLINDAMYCIN PHOSPHATE SCH MLS/HR: 6 INJECTION, SOLUTION INTRAVENOUS at 22:00

## 2020-12-03 RX ADMIN — LISINOPRIL SCH MG: 10 TABLET ORAL at 09:44

## 2020-12-03 RX ADMIN — CLONAZEPAM PRN MG: 1 TABLET ORAL at 08:08

## 2020-12-03 RX ADMIN — CEFEPIME HYDROCHLORIDE SCH MLS/HR: 1 INJECTION, POWDER, FOR SOLUTION INTRAMUSCULAR; INTRAVENOUS at 06:23

## 2020-12-03 RX ADMIN — DOCUSATE SODIUM PRN MG: 100 TABLET, FILM COATED ORAL at 22:30

## 2020-12-03 RX ADMIN — SODIUM CHLORIDE SCH MLS/HR: 9 INJECTION, SOLUTION INTRAVENOUS at 11:02

## 2020-12-03 RX ADMIN — Medication PRN MG: at 16:34

## 2020-12-03 RX ADMIN — CLINDAMYCIN PHOSPHATE SCH MLS/HR: 6 INJECTION, SOLUTION INTRAVENOUS at 05:54

## 2020-12-03 RX ADMIN — VANCOMYCIN HYDROCHLORIDE SCH MLS/HR: 1 INJECTION, SOLUTION INTRAVENOUS at 09:12

## 2020-12-03 RX ADMIN — ATORVASTATIN CALCIUM SCH MG: 20 TABLET, FILM COATED ORAL at 21:29

## 2020-12-03 RX ADMIN — CEFEPIME HYDROCHLORIDE SCH MLS/HR: 1 INJECTION, POWDER, FOR SOLUTION INTRAMUSCULAR; INTRAVENOUS at 17:05

## 2020-12-03 RX ADMIN — CLINDAMYCIN PHOSPHATE SCH MLS/HR: 6 INJECTION, SOLUTION INTRAVENOUS at 14:25

## 2020-12-03 RX ADMIN — VANCOMYCIN SCH MLS/HR: 1.5 INJECTION, SOLUTION INTRAVENOUS at 21:29

## 2020-12-03 RX ADMIN — HYDROCODONE BITARTRATE AND ACETAMINOPHEN PRN EA: 5; 325 TABLET ORAL at 22:15

## 2020-12-03 RX ADMIN — DOCUSATE SODIUM PRN MG: 100 TABLET, FILM COATED ORAL at 22:32

## 2020-12-03 RX ADMIN — CLONAZEPAM PRN MG: 1 TABLET ORAL at 21:30

## 2020-12-03 RX ADMIN — PAROXETINE HYDROCHLORIDE HEMIHYDRATE SCH MG: 20 TABLET, FILM COATED ORAL at 09:12

## 2020-12-03 RX ADMIN — HYDROCODONE BITARTRATE AND ACETAMINOPHEN PRN EA: 5; 325 TABLET ORAL at 08:07

## 2020-12-03 RX ADMIN — SODIUM CHLORIDE SCH MLS/HR: 9 INJECTION, SOLUTION INTRAVENOUS at 17:05

## 2020-12-03 NOTE — NUR
BEDSIDE SHIFT REPORT RECEIVED FROM THE NIGHT SHIFT RN. EDUCATED PT ABOUT FALL PRECAUTIONS. 
PT VERBALIZED UNDERSTANDING. CALL LIGHT WITH IN EASY REACH. BED IS LOW AND LOCKED. SIDE 
RAILS X2. ALL SAFETY MEASURES IN PLACE. PT DENIES NEEDS AT THIS TIME.

## 2020-12-03 NOTE — NUR
PT VOICED CONCERN ON PLACEMENT AFTER D/C. PAGED DR. FUNEZ AND REPORTED THE SAME. PAGED 
 REGARDING PT CONCERN.

## 2020-12-03 NOTE — NUR
SPOKE WITH PT WITH [facility] IN ROOM, EDUCATED HIM ABOUT CRITERIA FOR PLACEMENT. HE STATES HIS 
WIFE LEFT THE APARTMENT AFTER HE CAME HERE AND THAT IS WHY HE WANTS TO BE PLACED. HE STATES 
HE HAS HIS SOCIAL SECURITY AND HIS SON LIVES IN [place], I ASKED HIM WHAT HE WOULD BE DOING 
IF HE WASNT HERE. HE STATES HE STILL HAS HIS KEY AND DOESNT THINK THEY WILL EVICT HIM WITH 
THE PANDEMIC. HE WILL GO HOME OR GET A HOTEL. HE IS ABLE TO CARE FOR SELF AND IS INDEPENDENT 
IN HIS ADL'S

## 2020-12-03 NOTE — NUR
Pt considering living options following discharge. 



 facilitated conversation about decision making and provided prayer.



Pt expressed appreciation for continued support.



NGA Tillmanlain

Spiritual Care Department

O: [phone]

## 2020-12-03 NOTE — NUR
IM- progress note

O/N see below



ROS; no f/c/s/n/V/D/HA/cp/sob/confusion/dizziness/vision changes/leg pain



v/s; revd

PE

tired appearing

anicteric; LEFT FACIAL AT [place] [facility] WITH 
ERYTHEMA/INDURATION/TENDERNESS/WARMTH; no skin breakdown/discharge

ns1s2

mod bs

soft nt nd 

no e/t

skin dry

n. affect



labs/meds revd



A/P: 

Cellulitis of face- IV abx; cultures

Furuncle of face- Possible abscess; IV abx; f/u imaging; 

Obesity- screen for DM; check lipids

BMI 31.5- as above

Prop; scd

DIpso: f/u labs; 



11-30 cont care; CT revd.

12-1 cont care.

12-2 cont IV abx; I&D pending; 

12-3 s/p I&D; check labs; cont IV abx



MANFRED FUNEZ MD, PHD.

## 2020-12-03 NOTE — NUR
RECEIVED PT IN CHAIR  AOX3 . RT SIDE OF THE FACE WITH DRESSING .LEFT AC 20G NS AT 100CC/HR , 
C/O PAIN CALL LIGHT WITH IN REACH ,CONTINUE TO MONITOR

## 2020-12-04 VITALS — DIASTOLIC BLOOD PRESSURE: 91 MMHG | SYSTOLIC BLOOD PRESSURE: 134 MMHG

## 2020-12-04 VITALS — SYSTOLIC BLOOD PRESSURE: 134 MMHG | DIASTOLIC BLOOD PRESSURE: 91 MMHG

## 2020-12-04 VITALS — SYSTOLIC BLOOD PRESSURE: 133 MMHG | DIASTOLIC BLOOD PRESSURE: 84 MMHG

## 2020-12-04 VITALS — SYSTOLIC BLOOD PRESSURE: 134 MMHG | DIASTOLIC BLOOD PRESSURE: 54 MMHG

## 2020-12-04 VITALS — SYSTOLIC BLOOD PRESSURE: 138 MMHG | DIASTOLIC BLOOD PRESSURE: 82 MMHG

## 2020-12-04 RX ADMIN — CLINDAMYCIN PHOSPHATE SCH MLS/HR: 6 INJECTION, SOLUTION INTRAVENOUS at 13:36

## 2020-12-04 RX ADMIN — CLINDAMYCIN PHOSPHATE SCH MLS/HR: 6 INJECTION, SOLUTION INTRAVENOUS at 13:15

## 2020-12-04 RX ADMIN — CLINDAMYCIN PHOSPHATE SCH MLS/HR: 6 INJECTION, SOLUTION INTRAVENOUS at 06:20

## 2020-12-04 RX ADMIN — SODIUM CHLORIDE SCH MLS/HR: 9 INJECTION, SOLUTION INTRAVENOUS at 05:08

## 2020-12-04 RX ADMIN — CLONAZEPAM PRN MG: 1 TABLET ORAL at 09:56

## 2020-12-04 RX ADMIN — VANCOMYCIN SCH MLS/HR: 1.5 INJECTION, SOLUTION INTRAVENOUS at 09:56

## 2020-12-04 RX ADMIN — PAROXETINE HYDROCHLORIDE HEMIHYDRATE SCH MG: 20 TABLET, FILM COATED ORAL at 09:46

## 2020-12-04 RX ADMIN — CEFEPIME HYDROCHLORIDE SCH MLS/HR: 1 INJECTION, POWDER, FOR SOLUTION INTRAMUSCULAR; INTRAVENOUS at 06:21

## 2020-12-04 RX ADMIN — LISINOPRIL SCH MG: 10 TABLET ORAL at 09:47

## 2020-12-04 RX ADMIN — HYDROCODONE BITARTRATE AND ACETAMINOPHEN PRN EA: 5; 325 TABLET ORAL at 05:18

## 2020-12-04 NOTE — NUR
BEDSIDE SHIFT REPORT RECEIVED FROM THE NIGHT SHIFT RN. PT AAOX4.  EDUCATED PT ABOUT FALL 
PRECAUTIONS. CALL LIGHT WITH IN EASY REACH. INSTRUCTED PT TO USE CALL LIGHT FOR ALL THE 
NEEDS. PT VERBALIZED UNDERSTANDING.  BED IS LOW AND LOCKED. SIDE RAILS X2.  PT DENIES NEEDS 
AT THIS TIME.